# Patient Record
Sex: MALE | Race: WHITE | NOT HISPANIC OR LATINO | Employment: FULL TIME | URBAN - METROPOLITAN AREA
[De-identification: names, ages, dates, MRNs, and addresses within clinical notes are randomized per-mention and may not be internally consistent; named-entity substitution may affect disease eponyms.]

---

## 2017-10-03 ENCOUNTER — OFFICE VISIT (OUTPATIENT)
Dept: FAMILY MEDICINE | Facility: CLINIC | Age: 42
End: 2017-10-03
Payer: COMMERCIAL

## 2017-10-03 VITALS
OXYGEN SATURATION: 94 % | DIASTOLIC BLOOD PRESSURE: 66 MMHG | WEIGHT: 219 LBS | RESPIRATION RATE: 18 BRPM | BODY MASS INDEX: 28.11 KG/M2 | TEMPERATURE: 98 F | HEIGHT: 74 IN | HEART RATE: 91 BPM | SYSTOLIC BLOOD PRESSURE: 122 MMHG

## 2017-10-03 DIAGNOSIS — Z00.00 ANNUAL PHYSICAL EXAM: Primary | ICD-10-CM

## 2017-10-03 DIAGNOSIS — M25.512 CHRONIC LEFT SHOULDER PAIN: ICD-10-CM

## 2017-10-03 DIAGNOSIS — G89.29 CHRONIC LEFT SHOULDER PAIN: ICD-10-CM

## 2017-10-03 PROCEDURE — 99999 PR PBB SHADOW E&M-EST. PATIENT-LVL IV: CPT | Mod: PBBFAC,,, | Performed by: FAMILY MEDICINE

## 2017-10-03 PROCEDURE — 99204 OFFICE O/P NEW MOD 45 MIN: CPT | Mod: S$GLB,,, | Performed by: FAMILY MEDICINE

## 2017-10-03 RX ORDER — IBUPROFEN 400 MG/1
400 TABLET ORAL EVERY 4 HOURS
COMMUNITY
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
## 2017-10-12 ENCOUNTER — LAB VISIT (OUTPATIENT)
Dept: LAB | Facility: HOSPITAL | Age: 42
End: 2017-10-12
Attending: FAMILY MEDICINE
Payer: COMMERCIAL

## 2017-10-12 DIAGNOSIS — Z00.00 ANNUAL PHYSICAL EXAM: ICD-10-CM

## 2017-10-12 LAB
ALBUMIN SERPL BCP-MCNC: 4.4 G/DL
ALP SERPL-CCNC: 67 U/L
ALT SERPL W/O P-5'-P-CCNC: 25 U/L
ANION GAP SERPL CALC-SCNC: 13 MMOL/L
AST SERPL-CCNC: 21 U/L
BASOPHILS # BLD AUTO: 0.02 K/UL
BASOPHILS NFR BLD: 0.4 %
BILIRUB SERPL-MCNC: 0.6 MG/DL
BUN SERPL-MCNC: 15 MG/DL
CALCIUM SERPL-MCNC: 9.6 MG/DL
CHLORIDE SERPL-SCNC: 105 MMOL/L
CHOLEST SERPL-MCNC: 238 MG/DL
CHOLEST/HDLC SERPL: 5.1 {RATIO}
CO2 SERPL-SCNC: 25 MMOL/L
CREAT SERPL-MCNC: 0.97 MG/DL
DIFFERENTIAL METHOD: NORMAL
EOSINOPHIL # BLD AUTO: 0.1 K/UL
EOSINOPHIL NFR BLD: 1.6 %
ERYTHROCYTE [DISTWIDTH] IN BLOOD BY AUTOMATED COUNT: 13.8 %
EST. GFR  (AFRICAN AMERICAN): >60 ML/MIN/1.73 M^2
EST. GFR  (NON AFRICAN AMERICAN): >60 ML/MIN/1.73 M^2
GLUCOSE SERPL-MCNC: 78 MG/DL
HCT VFR BLD AUTO: 42.9 %
HDLC SERPL-MCNC: 47 MG/DL
HDLC SERPL: 19.7 %
HGB BLD-MCNC: 14.1 G/DL
LDLC SERPL CALC-MCNC: 165.6 MG/DL
LYMPHOCYTES # BLD AUTO: 1.7 K/UL
LYMPHOCYTES NFR BLD: 34.5 %
MCH RBC QN AUTO: 28.9 PG
MCHC RBC AUTO-ENTMCNC: 32.9 G/DL
MCV RBC AUTO: 88 FL
MONOCYTES # BLD AUTO: 0.5 K/UL
MONOCYTES NFR BLD: 10.2 %
NEUTROPHILS # BLD AUTO: 2.7 K/UL
NEUTROPHILS NFR BLD: 53.3 %
NONHDLC SERPL-MCNC: 191 MG/DL
PLATELET # BLD AUTO: 207 K/UL
PMV BLD AUTO: 11.3 FL
POTASSIUM SERPL-SCNC: 4.3 MMOL/L
PROT SERPL-MCNC: 7.4 G/DL
RBC # BLD AUTO: 4.88 M/UL
SODIUM SERPL-SCNC: 143 MMOL/L
TRIGL SERPL-MCNC: 127 MG/DL
TSH SERPL DL<=0.005 MIU/L-ACNC: 1.36 UIU/ML
WBC # BLD AUTO: 4.99 K/UL

## 2017-10-12 PROCEDURE — 85025 COMPLETE CBC W/AUTO DIFF WBC: CPT | Mod: PO

## 2017-10-12 PROCEDURE — 36415 COLL VENOUS BLD VENIPUNCTURE: CPT | Mod: PO

## 2017-10-12 PROCEDURE — 80053 COMPREHEN METABOLIC PANEL: CPT | Mod: PO

## 2017-10-12 PROCEDURE — 84443 ASSAY THYROID STIM HORMONE: CPT | Mod: PO

## 2017-10-12 PROCEDURE — 80061 LIPID PANEL: CPT

## 2017-10-17 ENCOUNTER — OFFICE VISIT (OUTPATIENT)
Dept: FAMILY MEDICINE | Facility: CLINIC | Age: 42
End: 2017-10-17
Payer: COMMERCIAL

## 2017-10-17 VITALS
DIASTOLIC BLOOD PRESSURE: 68 MMHG | HEIGHT: 74 IN | WEIGHT: 217.5 LBS | OXYGEN SATURATION: 98 % | BODY MASS INDEX: 27.91 KG/M2 | HEART RATE: 108 BPM | RESPIRATION RATE: 18 BRPM | SYSTOLIC BLOOD PRESSURE: 122 MMHG | TEMPERATURE: 98 F

## 2017-10-17 DIAGNOSIS — E78.5 HYPERLIPIDEMIA, UNSPECIFIED HYPERLIPIDEMIA TYPE: Primary | ICD-10-CM

## 2017-10-17 PROCEDURE — 99999 PR PBB SHADOW E&M-EST. PATIENT-LVL III: CPT | Mod: PBBFAC,,, | Performed by: FAMILY MEDICINE

## 2017-10-17 PROCEDURE — 99214 OFFICE O/P EST MOD 30 MIN: CPT | Mod: S$GLB,,, | Performed by: FAMILY MEDICINE

## 2017-10-17 NOTE — PROGRESS NOTES
"HPI:  Nicolas Marx is a 42 y.o. year old male that  presents for review of lab results.  Patient states that he has stopped smoking and replaced this with the period he now currently takes 20 ounces of coffee with 4 teaspoons sugar daily.  Patient is a 68 in further improving his lifestyle to have a positive effect on his cholesterol and weight..  Chief Complaint   Patient presents with    Follow-up     lab results   .     HPI      History reviewed. No pertinent past medical history.  Social History     Social History    Marital status:      Spouse name: N/A    Number of children: N/A    Years of education: N/A     Occupational History    Not on file.     Social History Main Topics    Smoking status: Former Smoker     Quit date: 4/5/2016    Smokeless tobacco: Never Used    Alcohol use Yes      Comment: occasional    Drug use: No    Sexual activity: Not on file     Other Topics Concern    Not on file     Social History Narrative    No narrative on file     Past Surgical History:   Procedure Laterality Date    HERNIA REPAIR      KNEE SURGERY      TONSILLECTOMY       Family History   Problem Relation Age of Onset    Emphysema Mother     Diabetes Father     No Known Problems Sister     No Known Problems Daughter     No Known Problems Sister            Review of Systems  General ROS: negative for chills, fever or weight loss  ENT ROS: negative for epistaxis, sore throat or rhinorrhea  Respiratory ROS: no cough, shortness of breath, or wheezing  Cardiovascular ROS: no chest pain or dyspnea on exertion  Gastrointestinal ROS: no abdominal pain, change in bowel habits, or black/ bloody stools    Physical Exam:  /68 (BP Location: Left arm, Patient Position: Sitting, BP Method: Medium (Manual))   Pulse 108   Temp 98.3 °F (36.8 °C) (Oral)   Resp 18   Ht 6' 2" (1.88 m)   Wt 98.6 kg (217 lb 7.7 oz)   SpO2 98%   BMI 27.92 kg/m²   General appearance: alert, cooperative, no " distress  Constitutional:Oriented to person, place, and time.appears well-developed and well-nourished.  Lungs: clear to auscultation bilaterally, no dullness to percussion bilaterally  Heart: regular rate and rhythm without rub; no displacement of the PMI , S1&S2 present  Physical Exam    LABS:    Complete Blood Count  Lab Results   Component Value Date    RBC 4.88 10/12/2017    HGB 14.1 10/12/2017    HCT 42.9 10/12/2017    MCV 88 10/12/2017    MCH 28.9 10/12/2017    MCHC 32.9 10/12/2017    RDW 13.8 10/12/2017     10/12/2017    MPV 11.3 10/12/2017    GRAN 2.7 10/12/2017    GRAN 53.3 10/12/2017    LYMPH 1.7 10/12/2017    LYMPH 34.5 10/12/2017    MONO 0.5 10/12/2017    MONO 10.2 10/12/2017    EOS 0.1 10/12/2017    BASO 0.02 10/12/2017    EOSINOPHIL 1.6 10/12/2017    BASOPHIL 0.4 10/12/2017    DIFFMETHOD Automated 10/12/2017       Comprehensive Metabolic Panel  Lab Results   Component Value Date    GLU 78 10/12/2017    BUN 15 10/12/2017    CREATININE 0.97 10/12/2017     10/12/2017    K 4.3 10/12/2017     10/12/2017    PROT 7.4 10/12/2017    ALBUMIN 4.4 10/12/2017    BILITOT 0.6 10/12/2017    AST 21 10/12/2017    ALKPHOS 67 10/12/2017    CO2 25 10/12/2017    ALT 25 10/12/2017    ANIONGAP 13 10/12/2017    EGFRNONAA >60.0 10/12/2017    ESTGFRAFRICA >60.0 10/12/2017       LIPID  Lab Results   Component Value Date    CHOL 238 (H) 10/12/2017    HDL 47 10/12/2017         TSH  Lab Results   Component Value Date    TSH 1.360 10/12/2017       Current Outpatient Prescriptions   Medication Sig Dispense Refill    ibuprofen (ADVIL,MOTRIN) 400 MG tablet Take 400 mg by mouth every 4 (four) hours.       No current facility-administered medications for this visit.        Assessment:    ICD-10-CM ICD-9-CM    1. Hyperlipidemia, unspecified hyperlipidemia type E78.5 272.4          Plan:  Patient does not wish to go on cholesterol medicine at this time.  He was given instructions on stricter modification of his  lifestyle choices when it comes to food choices.  He was given 3 months to make improvements to change cholesterol or he agrees to go on cholesterol lowering medication on next visit.  Nicolas was given information on how to improve their cholesterol by 1) Decreasing their intake of high fat foods (i.e. Fried foods,wahl, potato chips), 2) Increase GOOD fat intake (i.e. Omega Fatty Acids- olive oil , baked and broiled fish, flax seed,coconut oil), 3) Exercise 5 times a week,  30min/day, 4) High fiber intake daily (i.e. Whole grains and vegetable and raw fruits.   Return in 3 months (on 1/17/2018).          Miya Craft MD

## 2017-10-23 NOTE — PATIENT INSTRUCTIONS
"  Lipid Panel  Does this test have other names?  Lipid profile, lipoprotein profile  What is this test?  This group of tests measures the amount of cholesterol and other fats in your blood.  Cholesterol and triglycerides are lipids, or fats. These fats are important for cell health, but they can be harmful when they build up in the blood. Sometimes they can lead to clogged, inflamed arteries, a condition call atherosclerosis. This may keep your heart from working normally.  This panel of tests helps predict your risk for heart disease and stroke.  A lipid panel measures these fats:  · Total cholesterol  · LDL ("bad") cholesterol  · HDL ("good") cholesterol  · Triglycerides, another type of fat that causes hardening of the arteries  Why do I need this test?  You may need this panel of tests if you have a family history of heart disease or stroke.  You may also have this test if your healthcare provider believes you're at risk for heart disease. These are risk factors:  · High blood pressure  · Diabetes or prediabetes  · Overweight or obesity  · Smoking  · Lack of exercise  · Diet of unhealthy foods  · Stress  · High total cholesterol  If you are already being treated for heart disease, you may have this test to see whether treatment is working.  What other tests might I have along with this test?  Your healthcare provider may also order other tests to look at how well your heart is working. These tests may include:  · Electrocardiogram, or ECG, which tests your heart's electrical impulses to see if it is beating normally  · Stress test, in which you may have to exercise while being monitored by ECG  · Echocardiogram, which uses sound waves to make pictures of your heart  · Cardiac catheterization. For this test, a healthcare provider puts a tube into your blood vessels and injects dye. X-rays are then done to look for clogs in the vessels.  Your provider may also order tests for high blood pressure or blood sugar, or " glucose.  What do my test results mean?  Many things may affect your lab test results. These include the method each lab uses to do the test. Even if your test results are different from the normal value, you may not have a problem. To learn what the results mean for you, talk with your healthcare provider.  Results are given in milligrams per deciliter (mg/dL). Here are the ranges for total cholesterol in adults:  · Normal: Less than 200 mg/dL  · Borderline high: 200 to 239 mg/dL  · High: At or above 240 mg/dL  These are the adult ranges for LDL cholesterol:  · Optimal: Less than 100 mg/dL (this is the goal for people with diabetes or heart disease)  · Near optimal: 100 to 129 mg/dL  · Borderline high: 130 to 159 mg/dL  · High: 160 to 189 mg/dL  · Very high: 190 mg/dL and higher  The above numbers are general guidelines, because actual goals depend on the number of risk factors you have for heart disease.  Your HDL cholesterol levels should be above 40 mg/dL. This type of fat is actually good for you because it lowers your risk of heart disease. The higher the number, the lower your risk. Sixty mg/dL or above is considered the level to protect you against heart disease.  · High levels of triglycerides are linked with a higher heart disease risk. Here are the adult ranges:  · Normal: Less than 150 mg/dL  · Borderline high: 150 to 199 mg/dL  · High: 200 to 499 mg/dL  · Very high: Above 500 mg/dL  Depending on your test results, your healthcare provider will decide whether you need lifestyle changes or medicines to lower your cholesterol.  Your results and targets will vary according to your age and health. If you have high blood pressure or diabetes, you're at higher risk of having heart disease. You may have to take medicine to get your cholesterol and triglyceride levels even lower.  How is this test done?  The test requires a blood sample, which is drawn through a needle from a vein in your arm.  Does this test  pose any risks?  Taking a blood sample with a needle carries risks that include bleeding, infection, bruising, or feeling dizzy. When the needle pricks your arm, you may feel a slight stinging sensation or pain. Afterward, the site may be slightly sore.  What might affect my test results?  Being sick or under stress, and taking certain medicines can affect your results.  What you eat, how often you exercise, and whether you smoke can also affect your lipid profile.  How do I prepare for the test?  You may need to not eat or drink anything but water for 12 to 14 hours before this test. In addition, be sure your healthcare provider knows about all medicines, herbs, vitamins, and supplements you are taking. This includes medicines that don't need a prescription and any illicit drugs you may use.      © 5260-1294 [employer]. [address] [place] [address], [place] [address]. All rights reserved. This information is not intended as a substitute for professional medical care. Always follow your healthcare professional's instructions.

## 2018-01-17 ENCOUNTER — OFFICE VISIT (OUTPATIENT)
Dept: FAMILY MEDICINE | Facility: CLINIC | Age: 43
End: 2018-01-17
Payer: COMMERCIAL

## 2018-01-17 VITALS
RESPIRATION RATE: 18 BRPM | BODY MASS INDEX: 28.12 KG/M2 | DIASTOLIC BLOOD PRESSURE: 68 MMHG | HEART RATE: 81 BPM | WEIGHT: 219.13 LBS | HEIGHT: 74 IN | SYSTOLIC BLOOD PRESSURE: 112 MMHG | TEMPERATURE: 98 F | OXYGEN SATURATION: 95 %

## 2018-01-17 DIAGNOSIS — N52.9 ERECTILE DYSFUNCTION, UNSPECIFIED ERECTILE DYSFUNCTION TYPE: ICD-10-CM

## 2018-01-17 DIAGNOSIS — E78.5 HYPERLIPIDEMIA, UNSPECIFIED HYPERLIPIDEMIA TYPE: Primary | ICD-10-CM

## 2018-01-17 PROCEDURE — 99999 PR PBB SHADOW E&M-EST. PATIENT-LVL IV: CPT | Mod: PBBFAC,,, | Performed by: FAMILY MEDICINE

## 2018-01-17 PROCEDURE — 99214 OFFICE O/P EST MOD 30 MIN: CPT | Mod: S$GLB,,, | Performed by: FAMILY MEDICINE

## 2018-01-17 NOTE — PROGRESS NOTES
HPI:  Nicolas Marx is a 42 y.o. year old male that  presents for f/u of hypercholesterol . He has changed what he is eating and cutting out saturated fats. He is more active at work. He feels that he may not be getting enough calories. He is eating more tuna.He is taking a multivitamin.   He would like his testosterone level checked because he has lost his sexual desire and does not get erections.  He is having trouble having difficulty with restful sleep through the night .  Chief Complaint   Patient presents with    Follow-up     3 month f/u on cholesterol--pt states he thinks he has been doing good   .     HPI      History reviewed. No pertinent past medical history.  Social History     Social History    Marital status:      Spouse name: N/A    Number of children: N/A    Years of education: N/A     Occupational History    Not on file.     Social History Main Topics    Smoking status: Former Smoker     Quit date: 4/5/2016    Smokeless tobacco: Never Used    Alcohol use Yes      Comment: occasional    Drug use: No    Sexual activity: Not on file     Other Topics Concern    Not on file     Social History Narrative    No narrative on file     Past Surgical History:   Procedure Laterality Date    HERNIA REPAIR      KNEE SURGERY      TONSILLECTOMY       Family History   Problem Relation Age of Onset    Emphysema Mother     Diabetes Father     No Known Problems Sister     No Known Problems Daughter     No Known Problems Sister            Review of Systems  General ROS: negative for chills, fever or weight loss  ENT ROS: negative for epistaxis, sore throat or rhinorrhea  Respiratory ROS: no cough, shortness of breath, or wheezing  Cardiovascular ROS: no chest pain or dyspnea on exertion  Gastrointestinal ROS: no abdominal pain, change in bowel habits, or black/ bloody stools    Physical Exam:  /68 (BP Location: Right arm, Patient Position: Sitting, BP Method: Medium (Manual))   Pulse  "81   Temp 98.1 °F (36.7 °C) (Oral)   Resp 18   Ht 6' 2" (1.88 m)   Wt 99.4 kg (219 lb 2.2 oz)   SpO2 95%   BMI 28.14 kg/m²   General appearance: alert, cooperative, no distress  Constitutional:Oriented to person, place, and time.appears well-developed and well-nourished.  Lungs: clear to auscultation bilaterally, no dullness to percussion bilaterally  Heart: regular rate and rhythm without rub; no displacement of the PMI , S1&S2 present    Physical Exam    LABS:    Complete Blood Count  Lab Results   Component Value Date    RBC 4.88 10/12/2017    HGB 14.1 10/12/2017    HCT 42.9 10/12/2017    MCV 88 10/12/2017    MCH 28.9 10/12/2017    MCHC 32.9 10/12/2017    RDW 13.8 10/12/2017     10/12/2017    MPV 11.3 10/12/2017    GRAN 2.7 10/12/2017    GRAN 53.3 10/12/2017    LYMPH 1.7 10/12/2017    LYMPH 34.5 10/12/2017    MONO 0.5 10/12/2017    MONO 10.2 10/12/2017    EOS 0.1 10/12/2017    BASO 0.02 10/12/2017    EOSINOPHIL 1.6 10/12/2017    BASOPHIL 0.4 10/12/2017    DIFFMETHOD Automated 10/12/2017       Comprehensive Metabolic Panel  Lab Results   Component Value Date    GLU 78 10/12/2017    BUN 15 10/12/2017    CREATININE 0.97 10/12/2017     10/12/2017    K 4.3 10/12/2017     10/12/2017    PROT 7.4 10/12/2017    ALBUMIN 4.4 10/12/2017    BILITOT 0.6 10/12/2017    AST 21 10/12/2017    ALKPHOS 67 10/12/2017    CO2 25 10/12/2017    ALT 25 10/12/2017    ANIONGAP 13 10/12/2017    EGFRNONAA >60.0 10/12/2017    ESTGFRAFRICA >60.0 10/12/2017       LIPID  Lab Results   Component Value Date    CHOL 238 (H) 10/12/2017    HDL 47 10/12/2017         TSH  Lab Results   Component Value Date    TSH 1.360 10/12/2017       Current Outpatient Prescriptions   Medication Sig Dispense Refill    ibuprofen (ADVIL,MOTRIN) 400 MG tablet Take 400 mg by mouth every 4 (four) hours.       No current facility-administered medications for this visit.        Assessment:    ICD-10-CM ICD-9-CM    1. Hyperlipidemia, unspecified " hyperlipidemia type E78.5 272.4 Lipid panel   2. Erectile dysfunction, unspecified erectile dysfunction type N52.9 607.84 Testosterone         Plan:    Follow-up in 4 weeks (on 2/12/2018).          Miya Craft MD

## 2018-01-23 ENCOUNTER — LAB VISIT (OUTPATIENT)
Dept: LAB | Facility: HOSPITAL | Age: 43
End: 2018-01-23
Attending: FAMILY MEDICINE
Payer: COMMERCIAL

## 2018-01-23 DIAGNOSIS — E78.5 HYPERLIPIDEMIA, UNSPECIFIED HYPERLIPIDEMIA TYPE: ICD-10-CM

## 2018-01-23 DIAGNOSIS — N52.9 ERECTILE DYSFUNCTION, UNSPECIFIED ERECTILE DYSFUNCTION TYPE: ICD-10-CM

## 2018-01-23 LAB
CHOLEST SERPL-MCNC: 237 MG/DL
CHOLEST/HDLC SERPL: 5.9 {RATIO}
HDLC SERPL-MCNC: 40 MG/DL
HDLC SERPL: 16.9 %
LDLC SERPL CALC-MCNC: 174.6 MG/DL
NONHDLC SERPL-MCNC: 197 MG/DL
TESTOST SERPL-MCNC: 413 NG/DL
TRIGL SERPL-MCNC: 112 MG/DL

## 2018-01-23 PROCEDURE — 36415 COLL VENOUS BLD VENIPUNCTURE: CPT | Mod: PO

## 2018-01-23 PROCEDURE — 84403 ASSAY OF TOTAL TESTOSTERONE: CPT | Mod: PO

## 2018-01-23 PROCEDURE — 80061 LIPID PANEL: CPT

## 2019-05-31 DIAGNOSIS — Z13.0 SCREENING FOR DEFICIENCY ANEMIA: Primary | ICD-10-CM

## 2019-05-31 DIAGNOSIS — Z13.1 DIABETES MELLITUS SCREENING: ICD-10-CM

## 2019-05-31 DIAGNOSIS — Z13.29 THYROID DISORDER SCREEN: ICD-10-CM

## 2019-05-31 DIAGNOSIS — E78.5 HYPERLIPIDEMIA, UNSPECIFIED HYPERLIPIDEMIA TYPE: ICD-10-CM

## 2019-06-03 ENCOUNTER — LAB VISIT (OUTPATIENT)
Dept: LAB | Facility: HOSPITAL | Age: 44
End: 2019-06-03
Attending: NURSE PRACTITIONER
Payer: COMMERCIAL

## 2019-06-03 DIAGNOSIS — Z13.29 THYROID DISORDER SCREEN: ICD-10-CM

## 2019-06-03 DIAGNOSIS — E78.5 HYPERLIPIDEMIA, UNSPECIFIED HYPERLIPIDEMIA TYPE: ICD-10-CM

## 2019-06-03 DIAGNOSIS — Z13.0 SCREENING FOR DEFICIENCY ANEMIA: ICD-10-CM

## 2019-06-03 DIAGNOSIS — Z13.1 DIABETES MELLITUS SCREENING: ICD-10-CM

## 2019-06-03 LAB
ALBUMIN SERPL BCP-MCNC: 3.9 G/DL (ref 3.5–5.2)
ALP SERPL-CCNC: 59 U/L (ref 38–126)
ALT SERPL W/O P-5'-P-CCNC: 21 U/L (ref 10–44)
ANION GAP SERPL CALC-SCNC: 6 MMOL/L (ref 8–16)
AST SERPL-CCNC: 20 U/L (ref 15–46)
BASOPHILS # BLD AUTO: 0.01 K/UL (ref 0–0.2)
BASOPHILS NFR BLD: 0.2 % (ref 0–1.9)
BILIRUB SERPL-MCNC: 0.3 MG/DL (ref 0.1–1)
BUN SERPL-MCNC: 18 MG/DL (ref 2–20)
CALCIUM SERPL-MCNC: 9.1 MG/DL (ref 8.7–10.5)
CHLORIDE SERPL-SCNC: 106 MMOL/L (ref 95–110)
CHOLEST SERPL-MCNC: 210 MG/DL (ref 120–199)
CHOLEST/HDLC SERPL: 4.9 {RATIO} (ref 2–5)
CO2 SERPL-SCNC: 28 MMOL/L (ref 23–29)
CREAT SERPL-MCNC: 0.9 MG/DL (ref 0.5–1.4)
DIFFERENTIAL METHOD: NORMAL
EOSINOPHIL # BLD AUTO: 0.1 K/UL (ref 0–0.5)
EOSINOPHIL NFR BLD: 2.7 % (ref 0–8)
ERYTHROCYTE [DISTWIDTH] IN BLOOD BY AUTOMATED COUNT: 13.5 % (ref 11.5–14.5)
EST. GFR  (AFRICAN AMERICAN): >60 ML/MIN/1.73 M^2
EST. GFR  (NON AFRICAN AMERICAN): >60 ML/MIN/1.73 M^2
GLUCOSE SERPL-MCNC: 102 MG/DL (ref 70–110)
HCT VFR BLD AUTO: 44.5 % (ref 40–54)
HDLC SERPL-MCNC: 43 MG/DL (ref 40–75)
HDLC SERPL: 20.5 % (ref 20–50)
HGB BLD-MCNC: 14.4 G/DL (ref 14–18)
LDLC SERPL CALC-MCNC: 142 MG/DL (ref 63–159)
LYMPHOCYTES # BLD AUTO: 1.8 K/UL (ref 1–4.8)
LYMPHOCYTES NFR BLD: 37.7 % (ref 18–48)
MCH RBC QN AUTO: 28.5 PG (ref 27–31)
MCHC RBC AUTO-ENTMCNC: 32.4 G/DL (ref 32–36)
MCV RBC AUTO: 88 FL (ref 82–98)
MONOCYTES # BLD AUTO: 0.5 K/UL (ref 0.3–1)
MONOCYTES NFR BLD: 9.5 % (ref 4–15)
NEUTROPHILS # BLD AUTO: 2.4 K/UL (ref 1.8–7.7)
NEUTROPHILS NFR BLD: 49.7 % (ref 38–73)
NONHDLC SERPL-MCNC: 167 MG/DL
PLATELET # BLD AUTO: 198 K/UL (ref 150–350)
PMV BLD AUTO: 10.9 FL (ref 9.2–12.9)
POTASSIUM SERPL-SCNC: 4.2 MMOL/L (ref 3.5–5.1)
PROT SERPL-MCNC: 6.7 G/DL (ref 6–8.4)
RBC # BLD AUTO: 5.05 M/UL (ref 4.6–6.2)
SODIUM SERPL-SCNC: 140 MMOL/L (ref 136–145)
TRIGL SERPL-MCNC: 125 MG/DL (ref 30–150)
TSH SERPL DL<=0.005 MIU/L-ACNC: 2.3 UIU/ML (ref 0.4–4)
WBC # BLD AUTO: 4.75 K/UL (ref 3.9–12.7)

## 2019-06-03 PROCEDURE — 84443 ASSAY THYROID STIM HORMONE: CPT | Mod: PO

## 2019-06-03 PROCEDURE — 36415 COLL VENOUS BLD VENIPUNCTURE: CPT | Mod: PO

## 2019-06-03 PROCEDURE — 80061 LIPID PANEL: CPT

## 2019-06-03 PROCEDURE — 85025 COMPLETE CBC W/AUTO DIFF WBC: CPT | Mod: PO

## 2019-06-03 PROCEDURE — 80053 COMPREHEN METABOLIC PANEL: CPT | Mod: PO

## 2019-06-14 ENCOUNTER — OFFICE VISIT (OUTPATIENT)
Dept: FAMILY MEDICINE | Facility: CLINIC | Age: 44
End: 2019-06-14
Payer: COMMERCIAL

## 2019-06-14 VITALS
DIASTOLIC BLOOD PRESSURE: 86 MMHG | BODY MASS INDEX: 27.84 KG/M2 | HEIGHT: 74 IN | SYSTOLIC BLOOD PRESSURE: 124 MMHG | RESPIRATION RATE: 18 BRPM | WEIGHT: 216.94 LBS | OXYGEN SATURATION: 96 % | TEMPERATURE: 98 F | HEART RATE: 76 BPM

## 2019-06-14 DIAGNOSIS — Z00.00 ANNUAL PHYSICAL EXAM: Primary | ICD-10-CM

## 2019-06-14 DIAGNOSIS — Z23 NEED FOR TDAP VACCINATION: ICD-10-CM

## 2019-06-14 DIAGNOSIS — N52.9 ERECTILE DYSFUNCTION, UNSPECIFIED ERECTILE DYSFUNCTION TYPE: ICD-10-CM

## 2019-06-14 DIAGNOSIS — Z13.0 SCREENING FOR DEFICIENCY ANEMIA: ICD-10-CM

## 2019-06-14 DIAGNOSIS — Z13.29 THYROID DISORDER SCREEN: ICD-10-CM

## 2019-06-14 DIAGNOSIS — H91.93 HEARING LOSS ASSOCIATED WITH SYNDROME, BILATERAL: ICD-10-CM

## 2019-06-14 DIAGNOSIS — E78.5 HYPERLIPIDEMIA, UNSPECIFIED HYPERLIPIDEMIA TYPE: ICD-10-CM

## 2019-06-14 DIAGNOSIS — Z13.1 DIABETES MELLITUS SCREENING: ICD-10-CM

## 2019-06-14 PROCEDURE — 99999 PR PBB SHADOW E&M-EST. PATIENT-LVL IV: ICD-10-PCS | Mod: PBBFAC,,, | Performed by: NURSE PRACTITIONER

## 2019-06-14 PROCEDURE — 99999 PR PBB SHADOW E&M-EST. PATIENT-LVL IV: CPT | Mod: PBBFAC,,, | Performed by: NURSE PRACTITIONER

## 2019-06-14 PROCEDURE — 99396 PR PREVENTIVE VISIT,EST,40-64: ICD-10-PCS | Mod: 25,S$GLB,, | Performed by: NURSE PRACTITIONER

## 2019-06-14 PROCEDURE — 99396 PREV VISIT EST AGE 40-64: CPT | Mod: 25,S$GLB,, | Performed by: NURSE PRACTITIONER

## 2019-06-14 PROCEDURE — 90715 TDAP VACCINE GREATER THAN OR EQUAL TO 7YO IM: ICD-10-PCS | Mod: S$GLB,,, | Performed by: NURSE PRACTITIONER

## 2019-06-14 PROCEDURE — 90471 TDAP VACCINE GREATER THAN OR EQUAL TO 7YO IM: ICD-10-PCS | Mod: S$GLB,,, | Performed by: NURSE PRACTITIONER

## 2019-06-14 PROCEDURE — 90471 IMMUNIZATION ADMIN: CPT | Mod: S$GLB,,, | Performed by: NURSE PRACTITIONER

## 2019-06-14 PROCEDURE — 90715 TDAP VACCINE 7 YRS/> IM: CPT | Mod: S$GLB,,, | Performed by: NURSE PRACTITIONER

## 2019-06-14 RX ORDER — TADALAFIL 10 MG/1
TABLET ORAL
COMMUNITY
End: 2022-01-19 | Stop reason: SDUPTHER

## 2019-06-14 RX ORDER — MELOXICAM 15 MG/1
TABLET ORAL
COMMUNITY
End: 2019-06-14 | Stop reason: ALTCHOICE

## 2019-06-14 RX ORDER — SULFAMETHOXAZOLE AND TRIMETHOPRIM 800; 160 MG/1; MG/1
TABLET ORAL
COMMUNITY
Start: 2019-05-02 | End: 2019-06-14 | Stop reason: SINTOL

## 2019-06-14 RX ORDER — PREDNISONE 50 MG/1
TABLET ORAL
COMMUNITY
End: 2019-06-14 | Stop reason: ALTCHOICE

## 2019-06-14 NOTE — PROGRESS NOTES
Subjective:       Patient ID: Nicolas Marx is a 44 y.o. male.    Chief Complaint: Annual Exam (Pt here for a wellness visit )      Patient is a 44-year-old white male with hyperlipidemia, bilateral hearing loss, and primary erectile dysfunction treated by urologist that is here today to establish care and get annual physical exam.  Patient was a previous patient of Dr. Miya Craft who is leaving the practice so is here today to establish care with me.    Patient has hyperlipidemia that is on lifestyle modifications only.  Cholesterol levels today are much improved from 2018.  See results below.  His 10 year cardiovascular risk is 2.3%.  Continue to work on lifestyle modifications and recheck in 1 year.    Patient has erectile dysfunction that is treated by Dr. Quintanilla.  Patient reports he had a recent prostatitis infection that was treated with Bactrim and had at allergic reaction/rash to it so stopped medication.  Reports no symptoms now.    Patient reports chronic bilateral hearing loss over 50% to ears that was evaluated by audiologist in the past.    Wellness labs:  -  CBC within normal limits  -  CMP within normal limits  -  total cholesterol is mildly high at 210 with an LDL of 142 but much improved from last year  -  TSH is within normal limits    Health maintenance:  -  due for Tdap vaccine today        Component      Latest Ref Rng & Units 6/3/2019 1/23/2018 10/12/2017   WBC      3.90 - 12.70 K/uL 4.75  4.99   RBC      4.60 - 6.20 M/uL 5.05  4.88   Hemoglobin      14.0 - 18.0 g/dL 14.4  14.1   Hematocrit      40.0 - 54.0 % 44.5  42.9   MCV      82 - 98 fL 88  88   MCH      27.0 - 31.0 pg 28.5  28.9   MCHC      32.0 - 36.0 g/dL 32.4  32.9   RDW      11.5 - 14.5 % 13.5  13.8   Platelets      150 - 350 K/uL 198  207   MPV      9.2 - 12.9 fL 10.9  11.3   Gran # (ANC)      1.8 - 7.7 K/uL 2.4  2.7   Lymph #      1.0 - 4.8 K/uL 1.8  1.7   Mono #      0.3 - 1.0 K/uL 0.5  0.5   Eos #      0.0 - 0.5 K/uL  0.1  0.1   Baso #      0.00 - 0.20 K/uL 0.01  0.02   Gran%      38.0 - 73.0 % 49.7  53.3   Lymph%      18.0 - 48.0 % 37.7  34.5   Mono%      4.0 - 15.0 % 9.5  10.2   Eosinophil%      0.0 - 8.0 % 2.7  1.6   Basophil%      0.0 - 1.9 % 0.2  0.4   Differential Method       Automated  Automated   Sodium      136 - 145 mmol/L 140  143   Potassium      3.5 - 5.1 mmol/L 4.2  4.3   Chloride      95 - 110 mmol/L 106  105   CO2      23 - 29 mmol/L 28  25   Glucose      70 - 110 mg/dL 102  78   BUN, Bld      2 - 20 mg/dL 18  15   Creatinine      0.50 - 1.40 mg/dL 0.90  0.97   Calcium      8.7 - 10.5 mg/dL 9.1  9.6   PROTEIN TOTAL      6.0 - 8.4 g/dL 6.7  7.4   Albumin      3.5 - 5.2 g/dL 3.9  4.4   BILIRUBIN TOTAL      0.1 - 1.0 mg/dL 0.3  0.6   Alkaline Phosphatase      38 - 126 U/L 59  67   AST      15 - 46 U/L 20  21   ALT      10 - 44 U/L 21  25   Anion Gap      8 - 16 mmol/L 6 (L)  13   eGFR if African American      >60 mL/min/1.73 m:2 >60.0  >60.0   eGFR if non African American      >60 mL/min/1.73 m:2 >60.0  >60.0   Cholesterol      120 - 199 mg/dL 210 (H) 237 (H) 238 (H)   Triglycerides      30 - 150 mg/dL 125 112 127   HDL      40 - 75 mg/dL 43 40 47   LDL Cholesterol External      63.0 - 159.0 mg/dL 142.0 174.6 (H) 165.6 (H)   Hdl/Cholesterol Ratio      20.0 - 50.0 % 20.5 16.9 (L) 19.7 (L)   Total Cholesterol/HDL Ratio      2.0 - 5.0 4.9 5.9 (H) 5.1 (H)   Non-HDL Cholesterol      mg/dL 167 197 191   TSH      0.400 - 4.000 uIU/mL 2.300  1.360     Current Outpatient Medications   Medication Sig Dispense Refill    ibuprofen (ADVIL,MOTRIN) 400 MG tablet Take 400 mg by mouth every 4 (four) hours.      tadalafil (CIALIS) 10 MG tablet Cialis 10 mg tablet   Take 1 tablet every day by oral route as needed for 10 days.       No current facility-administered medications for this visit.        Past Medical History:   Diagnosis Date    ED (erectile dysfunction)     Hearing loss associated with syndrome, bilateral     bilateral  hearing loss with chronic tinnitus -tested by audiologist    Hyperlipidemia 2017    on lifestyle modifications only       Past Surgical History:   Procedure Laterality Date    HERNIA REPAIR      hernia repair as a baby - unsure of location of hernia    KNEE SURGERY Left     infected blood vessel while in the services    SURGICAL REMOVAL OF LYMPH NODE Left     left groin - removed due to infection    TONSILLECTOMY      TYMPANOSTOMY TUBE PLACEMENT      chronic ear infections as a child       Family History   Problem Relation Age of Onset    Emphysema Mother     Hypertension Mother     Hyperlipidemia Mother     Diabetes Father     Hypertension Father     Hyperlipidemia Father     No Known Problems Sister     No Known Problems Daughter     No Known Problems Sister        Social History     Socioeconomic History    Marital status:      Spouse name: Not on file    Number of children: Not on file    Years of education: Not on file    Highest education level: Not on file   Occupational History    Occupation: IT/computer work   Social Needs    Financial resource strain: Not on file    Food insecurity:     Worry: Not on file     Inability: Not on file    Transportation needs:     Medical: Not on file     Non-medical: Not on file   Tobacco Use    Smoking status: Former Smoker     Packs/day: 1.00     Years: 30.00     Pack years: 30.00     Types: Cigarettes     Last attempt to quit: 4/5/2016     Years since quitting: 3.1    Smokeless tobacco: Never Used   Substance and Sexual Activity    Alcohol use: Yes     Comment: once every 3 months - 2 to 3 drinks per occasion    Drug use: No    Sexual activity: Not on file   Lifestyle    Physical activity:     Days per week: Not on file     Minutes per session: Not on file    Stress: Not on file   Relationships    Social connections:     Talks on phone: Not on file     Gets together: Not on file     Attends Mandaeism service: Not on file     Active  "member of club or organization: Not on file     Attends meetings of clubs or organizations: Not on file     Relationship status: Not on file   Other Topics Concern    Not on file   Social History Narrative    Not on file       Review of Systems   Constitutional: Negative for activity change, appetite change, fatigue, fever and unexpected weight change.   HENT: Negative for congestion, ear pain, mouth sores, nosebleeds, postnasal drip, rhinorrhea, sinus pressure, sneezing, sore throat, trouble swallowing and voice change.    Eyes: Negative.    Respiratory: Negative for cough, chest tightness and shortness of breath.    Cardiovascular: Negative for chest pain, palpitations and leg swelling.   Gastrointestinal: Negative.  Negative for abdominal pain, blood in stool, constipation, diarrhea, nausea and vomiting.   Endocrine: Negative.    Genitourinary: Negative for difficulty urinating, dysuria, flank pain, hematuria and urgency.   Musculoskeletal: Negative for arthralgias, back pain, gait problem, joint swelling, myalgias and neck pain.   Skin: Negative for color change, rash and wound.   Allergic/Immunologic: Negative for immunocompromised state.   Neurological: Negative for dizziness, tremors, seizures, syncope, speech difficulty and headaches.   Hematological: Negative for adenopathy. Does not bruise/bleed easily.   Psychiatric/Behavioral: Negative for behavioral problems, dysphoric mood, sleep disturbance and suicidal ideas. The patient is not nervous/anxious.          Objective:     Vitals:    06/14/19 1442   BP: 124/86   BP Location: Left arm   Patient Position: Sitting   BP Method: Medium (Manual)   Pulse: 76   Resp: 18   Temp: 98.1 °F (36.7 °C)   TempSrc: Oral   SpO2: 96%   Weight: 98.4 kg (216 lb 14.9 oz)   Height: 6' 2" (1.88 m)          Physical Exam   Constitutional: He is oriented to person, place, and time. He appears well-developed and well-nourished.   Body mass index is 27.85 kg/m².     HENT:   Head: " Normocephalic.   Right Ear: External ear normal. Tympanic membrane is scarred.   Left Ear: External ear normal.   Nose: Nose normal.   Mouth/Throat: Oropharynx is clear and moist. No oropharyngeal exudate.   Eyes: Pupils are equal, round, and reactive to light. EOM are normal. Right eye exhibits no discharge. Left eye exhibits no discharge. No scleral icterus.   Neck: Normal range of motion. Neck supple. No tracheal deviation present. No thyromegaly present.   Cardiovascular: Normal rate, regular rhythm and normal heart sounds.   No murmur heard.  Pulmonary/Chest: Effort normal and breath sounds normal. No respiratory distress.   Abdominal: Soft. He exhibits no distension.   Musculoskeletal: Normal range of motion. He exhibits no edema.   Lymphadenopathy:     He has no cervical adenopathy.   Neurological: He is alert and oriented to person, place, and time. Coordination normal.   Skin: Skin is warm and dry. No rash noted.   Psychiatric: He has a normal mood and affect. His behavior is normal.         Assessment:         ICD-10-CM ICD-9-CM   1. Annual physical exam Z00.00 V70.0   2. Erectile dysfunction, unspecified erectile dysfunction type N52.9 607.84   3. Hearing loss associated with syndrome, bilateral H91.93 389.8   4. Hyperlipidemia, unspecified hyperlipidemia type E78.5 272.4   5. Thyroid disorder screen Z13.29 V77.0   6. Screening for deficiency anemia Z13.0 V78.1   7. Diabetes mellitus screening Z13.1 V77.1   8. Need for Tdap vaccination Z23 V06.1       Plan:       Annual physical exam  Health Maintenance Summary     Influenza Vaccine Next Due 8/1/2019    Lipid Panel Next Due 6/3/2024     Done 6/3/2019 LIPID PANEL     Done 1/23/2018 LIPID PANEL     Done 10/12/2017 LIPID PANEL    TETANUS VACCINE Next Due 6/14/2029     Done 6/14/2019 Imm Admin: Tdap       Erectile dysfunction, unspecified erectile dysfunction type  -  treated by Urology    Hearing loss associated with syndrome, bilateral  -  evaluated by  audiologist and ENT MDs in the past.  Has chronic tinnitus.    Hyperlipidemia, unspecified hyperlipidemia type  -  levels having improved in the past 2 years with lifestyle modifications.  Ten year risk is at 2.3%.  Recheck in 1 year.  -     Lipid panel; Future; Expected date: 06/08/2020    Thyroid disorder screen  -     TSH; Future; Expected date: 06/08/2020    Screening for deficiency anemia  -     CBC auto differential; Future; Expected date: 06/08/2020    Diabetes mellitus screening  -     Comprehensive metabolic panel; Future; Expected date: 06/08/2020    Need for Tdap vaccination  -     Tdap Vaccine      Follow up in about 1 year (around 6/14/2020) for fasting labs and WELLNESS EXAM.     Patient's Medications   New Prescriptions    No medications on file   Previous Medications    IBUPROFEN (ADVIL,MOTRIN) 400 MG TABLET    Take 400 mg by mouth every 4 (four) hours.    TADALAFIL (CIALIS) 10 MG TABLET    Cialis 10 mg tablet   Take 1 tablet every day by oral route as needed for 10 days.   Modified Medications    No medications on file   Discontinued Medications    MELOXICAM (MOBIC) 15 MG TABLET    meloxicam 15 mg tablet   Take 1 tablet every day by oral route for 30 days.    PREDNISONE (DELTASONE) 50 MG TAB    prednisone 50 mg tablet   Take 1 tablet every day by oral route stop meloxicam while using this medication for 7 days.    SULFAMETHOXAZOLE-TRIMETHOPRIM 800-160MG (BACTRIM DS) 800-160 MG TAB

## 2021-01-04 ENCOUNTER — PATIENT MESSAGE (OUTPATIENT)
Dept: ADMINISTRATIVE | Facility: HOSPITAL | Age: 46
End: 2021-01-04

## 2021-04-05 ENCOUNTER — PATIENT MESSAGE (OUTPATIENT)
Dept: ADMINISTRATIVE | Facility: HOSPITAL | Age: 46
End: 2021-04-05

## 2021-07-07 ENCOUNTER — PATIENT MESSAGE (OUTPATIENT)
Dept: ADMINISTRATIVE | Facility: HOSPITAL | Age: 46
End: 2021-07-07

## 2021-10-05 ENCOUNTER — PATIENT MESSAGE (OUTPATIENT)
Dept: ADMINISTRATIVE | Facility: HOSPITAL | Age: 46
End: 2021-10-05

## 2021-11-15 ENCOUNTER — TELEPHONE (OUTPATIENT)
Dept: FAMILY MEDICINE | Facility: CLINIC | Age: 46
End: 2021-11-15
Payer: COMMERCIAL

## 2021-11-15 DIAGNOSIS — Z11.4 SCREENING FOR HIV (HUMAN IMMUNODEFICIENCY VIRUS): ICD-10-CM

## 2021-11-15 DIAGNOSIS — Z00.00 ANNUAL PHYSICAL EXAM: Primary | ICD-10-CM

## 2021-11-15 DIAGNOSIS — Z11.59 SCREENING FOR VIRAL DISEASE: ICD-10-CM

## 2021-11-22 ENCOUNTER — HOSPITAL ENCOUNTER (EMERGENCY)
Facility: HOSPITAL | Age: 46
Discharge: HOME | End: 2021-11-22
Attending: FAMILY MEDICINE
Payer: COMMERCIAL

## 2021-11-22 VITALS
TEMPERATURE: 98 F | SYSTOLIC BLOOD PRESSURE: 124 MMHG | DIASTOLIC BLOOD PRESSURE: 87 MMHG | BODY MASS INDEX: 30.46 KG/M2 | HEART RATE: 97 BPM | RESPIRATION RATE: 24 BRPM | HEIGHT: 75 IN | OXYGEN SATURATION: 93 % | WEIGHT: 245 LBS

## 2021-11-22 DIAGNOSIS — J20.9 ACUTE BRONCHITIS, UNSPECIFIED ORGANISM: Primary | ICD-10-CM

## 2021-11-22 DIAGNOSIS — R06.02 SOB (SHORTNESS OF BREATH): ICD-10-CM

## 2021-11-22 LAB
ALBUMIN SERPL BCP-MCNC: 4.3 G/DL (ref 3.5–5.2)
ALP SERPL-CCNC: 74 U/L (ref 38–126)
ALT SERPL W/O P-5'-P-CCNC: 18 U/L (ref 10–44)
ANION GAP SERPL CALC-SCNC: 9 MMOL/L (ref 8–16)
AST SERPL-CCNC: 17 U/L (ref 15–46)
BASOPHILS # BLD AUTO: 0.03 K/UL (ref 0–0.2)
BASOPHILS NFR BLD: 0.4 % (ref 0–1.9)
BILIRUB SERPL-MCNC: 0.7 MG/DL (ref 0.1–1)
CALCIUM SERPL-MCNC: 9.1 MG/DL (ref 8.7–10.5)
CHLORIDE SERPL-SCNC: 105 MMOL/L (ref 95–110)
CO2 SERPL-SCNC: 28 MMOL/L (ref 23–29)
CREAT SERPL-MCNC: 1.07 MG/DL (ref 0.5–1.4)
D DIMER PPP IA.FEU-MCNC: <0.19 MG/L FEU
DIFFERENTIAL METHOD: NORMAL
EOSINOPHIL # BLD AUTO: 0.2 K/UL (ref 0–0.5)
EOSINOPHIL NFR BLD: 2.1 % (ref 0–8)
ERYTHROCYTE [DISTWIDTH] IN BLOOD BY AUTOMATED COUNT: 12.7 % (ref 11.5–14.5)
EST. GFR  (AFRICAN AMERICAN): >60 ML/MIN/1.73 M^2
EST. GFR  (NON AFRICAN AMERICAN): >60 ML/MIN/1.73 M^2
GLUCOSE SERPL-MCNC: 126 MG/DL (ref 70–110)
GROUP A STREP, MOLECULAR: NEGATIVE
HCT VFR BLD AUTO: 45.6 % (ref 40–54)
HGB BLD-MCNC: 15 G/DL (ref 14–18)
IMM GRANULOCYTES # BLD AUTO: 0.02 K/UL (ref 0–0.04)
IMM GRANULOCYTES NFR BLD AUTO: 0.3 % (ref 0–0.5)
INFLUENZA A, MOLECULAR: NEGATIVE
INFLUENZA B, MOLECULAR: NEGATIVE
LYMPHOCYTES # BLD AUTO: 1.8 K/UL (ref 1–4.8)
LYMPHOCYTES NFR BLD: 24.4 % (ref 18–48)
MCH RBC QN AUTO: 29.3 PG (ref 27–31)
MCHC RBC AUTO-ENTMCNC: 32.9 G/DL (ref 32–36)
MCV RBC AUTO: 89 FL (ref 82–98)
MONOCYTES # BLD AUTO: 0.6 K/UL (ref 0.3–1)
MONOCYTES NFR BLD: 8.3 % (ref 4–15)
NEUTROPHILS # BLD AUTO: 4.7 K/UL (ref 1.8–7.7)
NEUTROPHILS NFR BLD: 64.5 % (ref 38–73)
NRBC BLD-RTO: 0 /100 WBC
NT-PROBNP SERPL-MCNC: 73 PG/ML (ref 5–450)
PLATELET # BLD AUTO: 199 K/UL (ref 150–450)
PMV BLD AUTO: 10.6 FL (ref 9.2–12.9)
POTASSIUM SERPL-SCNC: 4.2 MMOL/L (ref 3.5–5.1)
PROT SERPL-MCNC: 7 G/DL (ref 6–8.4)
RBC # BLD AUTO: 5.12 M/UL (ref 4.6–6.2)
SARS-COV-2 RDRP RESP QL NAA+PROBE: NEGATIVE
SODIUM SERPL-SCNC: 142 MMOL/L (ref 136–145)
SPECIMEN SOURCE: NORMAL
TROPONIN I SERPL-MCNC: <0.012 NG/ML (ref 0.01–0.03)
UUN UR-MCNC: 17 MG/DL (ref 2–20)
WBC # BLD AUTO: 7.24 K/UL (ref 3.9–12.7)

## 2021-11-22 PROCEDURE — 93010 ELECTROCARDIOGRAM REPORT: CPT | Mod: ,,, | Performed by: INTERNAL MEDICINE

## 2021-11-22 PROCEDURE — 99285 EMERGENCY DEPT VISIT HI MDM: CPT | Mod: 25,ER

## 2021-11-22 PROCEDURE — 80053 COMPREHEN METABOLIC PANEL: CPT | Mod: ER | Performed by: FAMILY MEDICINE

## 2021-11-22 PROCEDURE — U0002 COVID-19 LAB TEST NON-CDC: HCPCS | Mod: ER | Performed by: FAMILY MEDICINE

## 2021-11-22 PROCEDURE — 25000003 PHARM REV CODE 250: Mod: ER | Performed by: FAMILY MEDICINE

## 2021-11-22 PROCEDURE — 83880 ASSAY OF NATRIURETIC PEPTIDE: CPT | Mod: ER | Performed by: FAMILY MEDICINE

## 2021-11-22 PROCEDURE — 94640 AIRWAY INHALATION TREATMENT: CPT | Mod: ER

## 2021-11-22 PROCEDURE — 93005 ELECTROCARDIOGRAM TRACING: CPT | Mod: ER

## 2021-11-22 PROCEDURE — 25500020 PHARM REV CODE 255: Mod: ER | Performed by: FAMILY MEDICINE

## 2021-11-22 PROCEDURE — 84484 ASSAY OF TROPONIN QUANT: CPT | Mod: ER | Performed by: FAMILY MEDICINE

## 2021-11-22 PROCEDURE — 85025 COMPLETE CBC W/AUTO DIFF WBC: CPT | Mod: ER | Performed by: FAMILY MEDICINE

## 2021-11-22 PROCEDURE — 85379 FIBRIN DEGRADATION QUANT: CPT | Mod: ER | Performed by: FAMILY MEDICINE

## 2021-11-22 PROCEDURE — 93010 EKG 12-LEAD: ICD-10-PCS | Mod: ,,, | Performed by: INTERNAL MEDICINE

## 2021-11-22 PROCEDURE — 63600175 PHARM REV CODE 636 W HCPCS: Mod: ER | Performed by: FAMILY MEDICINE

## 2021-11-22 PROCEDURE — 96374 THER/PROPH/DIAG INJ IV PUSH: CPT | Mod: ER

## 2021-11-22 PROCEDURE — 25000242 PHARM REV CODE 250 ALT 637 W/ HCPCS: Mod: ER | Performed by: FAMILY MEDICINE

## 2021-11-22 PROCEDURE — 87651 STREP A DNA AMP PROBE: CPT | Mod: ER | Performed by: FAMILY MEDICINE

## 2021-11-22 PROCEDURE — 87502 INFLUENZA DNA AMP PROBE: CPT | Mod: ER | Performed by: FAMILY MEDICINE

## 2021-11-22 RX ORDER — PREDNISONE 20 MG/1
60 TABLET ORAL
Status: COMPLETED | OUTPATIENT
Start: 2021-11-22 | End: 2021-11-22

## 2021-11-22 RX ORDER — ALBUTEROL SULFATE 2.5 MG/.5ML
2.5 SOLUTION RESPIRATORY (INHALATION)
Status: COMPLETED | OUTPATIENT
Start: 2021-11-22 | End: 2021-11-22

## 2021-11-22 RX ORDER — METHYLPREDNISOLONE SOD SUCC 125 MG
125 VIAL (EA) INJECTION
Status: COMPLETED | OUTPATIENT
Start: 2021-11-22 | End: 2021-11-22

## 2021-11-22 RX ORDER — BENZONATATE 100 MG/1
200 CAPSULE ORAL
Status: COMPLETED | OUTPATIENT
Start: 2021-11-22 | End: 2021-11-22

## 2021-11-22 RX ORDER — ALBUTEROL SULFATE 90 UG/1
2 AEROSOL, METERED RESPIRATORY (INHALATION) EVERY 6 HOURS PRN
Qty: 6.7 G | Refills: 0 | Status: SHIPPED | OUTPATIENT
Start: 2021-11-22 | End: 2022-01-19

## 2021-11-22 RX ORDER — BENZONATATE 200 MG/1
200 CAPSULE ORAL 3 TIMES DAILY PRN
Qty: 30 CAPSULE | Refills: 0 | Status: SHIPPED | OUTPATIENT
Start: 2021-11-22 | End: 2021-12-02

## 2021-11-22 RX ORDER — IPRATROPIUM BROMIDE AND ALBUTEROL SULFATE 2.5; .5 MG/3ML; MG/3ML
3 SOLUTION RESPIRATORY (INHALATION)
Status: DISCONTINUED | OUTPATIENT
Start: 2021-11-22 | End: 2021-11-22

## 2021-11-22 RX ORDER — PREDNISONE 20 MG/1
40 TABLET ORAL DAILY
Qty: 10 TABLET | Refills: 0 | Status: SHIPPED | OUTPATIENT
Start: 2021-11-22 | End: 2021-11-27

## 2021-11-22 RX ADMIN — PREDNISONE 60 MG: 20 TABLET ORAL at 10:11

## 2021-11-22 RX ADMIN — ALBUTEROL SULFATE 2.5 MG: 2.5 SOLUTION RESPIRATORY (INHALATION) at 10:11

## 2021-11-22 RX ADMIN — IOHEXOL 100 ML: 350 INJECTION, SOLUTION INTRAVENOUS at 12:11

## 2021-11-22 RX ADMIN — ALBUTEROL SULFATE 2.5 MG: 2.5 SOLUTION RESPIRATORY (INHALATION) at 11:11

## 2021-11-22 RX ADMIN — BENZONATATE 200 MG: 100 CAPSULE ORAL at 10:11

## 2021-11-22 RX ADMIN — METHYLPREDNISOLONE SODIUM SUCCINATE 125 MG: 125 INJECTION, POWDER, FOR SOLUTION INTRAMUSCULAR; INTRAVENOUS at 01:11

## 2022-01-06 ENCOUNTER — LAB VISIT (OUTPATIENT)
Dept: LAB | Facility: HOSPITAL | Age: 47
End: 2022-01-06
Attending: INTERNAL MEDICINE
Payer: COMMERCIAL

## 2022-01-06 DIAGNOSIS — Z11.4 SCREENING FOR HIV (HUMAN IMMUNODEFICIENCY VIRUS): ICD-10-CM

## 2022-01-06 DIAGNOSIS — Z11.59 SCREENING FOR VIRAL DISEASE: ICD-10-CM

## 2022-01-06 DIAGNOSIS — Z00.00 ANNUAL PHYSICAL EXAM: ICD-10-CM

## 2022-01-06 LAB
ALBUMIN SERPL BCP-MCNC: 3.9 G/DL (ref 3.5–5.2)
ALP SERPL-CCNC: 68 U/L (ref 38–126)
ALT SERPL W/O P-5'-P-CCNC: 24 U/L (ref 10–44)
ANION GAP SERPL CALC-SCNC: 9 MMOL/L (ref 8–16)
AST SERPL-CCNC: 20 U/L (ref 15–46)
BILIRUB SERPL-MCNC: 0.5 MG/DL (ref 0.1–1)
CALCIUM SERPL-MCNC: 8.5 MG/DL (ref 8.7–10.5)
CHLORIDE SERPL-SCNC: 109 MMOL/L (ref 95–110)
CHOLEST SERPL-MCNC: 207 MG/DL (ref 120–199)
CHOLEST/HDLC SERPL: 6.3 {RATIO} (ref 2–5)
CO2 SERPL-SCNC: 26 MMOL/L (ref 23–29)
CREAT SERPL-MCNC: 1 MG/DL (ref 0.5–1.4)
EST. GFR  (AFRICAN AMERICAN): >60 ML/MIN/1.73 M^2
EST. GFR  (NON AFRICAN AMERICAN): >60 ML/MIN/1.73 M^2
GLUCOSE SERPL-MCNC: 113 MG/DL (ref 70–110)
HCV AB SERPL QL IA: NEGATIVE
HDLC SERPL-MCNC: 33 MG/DL (ref 40–75)
HDLC SERPL: 15.9 % (ref 20–50)
HIV 1+2 AB+HIV1 P24 AG SERPL QL IA: NEGATIVE
LDLC SERPL CALC-MCNC: 146.2 MG/DL (ref 63–159)
NONHDLC SERPL-MCNC: 174 MG/DL
POTASSIUM SERPL-SCNC: 4.1 MMOL/L (ref 3.5–5.1)
PROT SERPL-MCNC: 6.6 G/DL (ref 6–8.4)
SODIUM SERPL-SCNC: 144 MMOL/L (ref 136–145)
TRIGL SERPL-MCNC: 139 MG/DL (ref 30–150)
UUN UR-MCNC: 17 MG/DL (ref 2–20)

## 2022-01-06 PROCEDURE — 80053 COMPREHEN METABOLIC PANEL: CPT | Mod: PO | Performed by: INTERNAL MEDICINE

## 2022-01-06 PROCEDURE — 86803 HEPATITIS C AB TEST: CPT | Mod: PO | Performed by: INTERNAL MEDICINE

## 2022-01-06 PROCEDURE — 80061 LIPID PANEL: CPT | Performed by: INTERNAL MEDICINE

## 2022-01-06 PROCEDURE — 87389 HIV-1 AG W/HIV-1&-2 AB AG IA: CPT | Mod: PO | Performed by: INTERNAL MEDICINE

## 2022-01-06 PROCEDURE — 36415 COLL VENOUS BLD VENIPUNCTURE: CPT | Mod: PO | Performed by: INTERNAL MEDICINE

## 2022-01-19 ENCOUNTER — OFFICE VISIT (OUTPATIENT)
Dept: FAMILY MEDICINE | Facility: CLINIC | Age: 47
End: 2022-01-19
Payer: COMMERCIAL

## 2022-01-19 VITALS
HEART RATE: 85 BPM | BODY MASS INDEX: 31.14 KG/M2 | DIASTOLIC BLOOD PRESSURE: 86 MMHG | HEIGHT: 72 IN | RESPIRATION RATE: 18 BRPM | SYSTOLIC BLOOD PRESSURE: 128 MMHG | TEMPERATURE: 98 F | OXYGEN SATURATION: 96 % | WEIGHT: 229.94 LBS

## 2022-01-19 DIAGNOSIS — N52.9 ERECTILE DYSFUNCTION, UNSPECIFIED ERECTILE DYSFUNCTION TYPE: ICD-10-CM

## 2022-01-19 DIAGNOSIS — Z12.11 COLON CANCER SCREENING: ICD-10-CM

## 2022-01-19 DIAGNOSIS — E78.5 HYPERLIPIDEMIA, UNSPECIFIED HYPERLIPIDEMIA TYPE: ICD-10-CM

## 2022-01-19 DIAGNOSIS — Z13.1 DIABETES MELLITUS SCREENING: ICD-10-CM

## 2022-01-19 DIAGNOSIS — Z00.00 ANNUAL PHYSICAL EXAM: Primary | ICD-10-CM

## 2022-01-19 DIAGNOSIS — Z13.29 THYROID DISORDER SCREEN: ICD-10-CM

## 2022-01-19 DIAGNOSIS — Z13.0 SCREENING FOR DEFICIENCY ANEMIA: ICD-10-CM

## 2022-01-19 PROCEDURE — 99999 PR PBB SHADOW E&M-EST. PATIENT-LVL III: ICD-10-PCS | Mod: PBBFAC,,, | Performed by: NURSE PRACTITIONER

## 2022-01-19 PROCEDURE — 99396 PR PREVENTIVE VISIT,EST,40-64: ICD-10-PCS | Mod: S$GLB,,, | Performed by: NURSE PRACTITIONER

## 2022-01-19 PROCEDURE — 99396 PREV VISIT EST AGE 40-64: CPT | Mod: S$GLB,,, | Performed by: NURSE PRACTITIONER

## 2022-01-19 PROCEDURE — 3079F DIAST BP 80-89 MM HG: CPT | Mod: CPTII,S$GLB,, | Performed by: NURSE PRACTITIONER

## 2022-01-19 PROCEDURE — 3079F PR MOST RECENT DIASTOLIC BLOOD PRESSURE 80-89 MM HG: ICD-10-PCS | Mod: CPTII,S$GLB,, | Performed by: NURSE PRACTITIONER

## 2022-01-19 PROCEDURE — 3008F BODY MASS INDEX DOCD: CPT | Mod: CPTII,S$GLB,, | Performed by: NURSE PRACTITIONER

## 2022-01-19 PROCEDURE — 1160F PR REVIEW ALL MEDS BY PRESCRIBER/CLIN PHARMACIST DOCUMENTED: ICD-10-PCS | Mod: CPTII,S$GLB,, | Performed by: NURSE PRACTITIONER

## 2022-01-19 PROCEDURE — 1159F MED LIST DOCD IN RCRD: CPT | Mod: CPTII,S$GLB,, | Performed by: NURSE PRACTITIONER

## 2022-01-19 PROCEDURE — 99999 PR PBB SHADOW E&M-EST. PATIENT-LVL III: CPT | Mod: PBBFAC,,, | Performed by: NURSE PRACTITIONER

## 2022-01-19 PROCEDURE — 3008F PR BODY MASS INDEX (BMI) DOCUMENTED: ICD-10-PCS | Mod: CPTII,S$GLB,, | Performed by: NURSE PRACTITIONER

## 2022-01-19 PROCEDURE — 3074F SYST BP LT 130 MM HG: CPT | Mod: CPTII,S$GLB,, | Performed by: NURSE PRACTITIONER

## 2022-01-19 PROCEDURE — 3074F PR MOST RECENT SYSTOLIC BLOOD PRESSURE < 130 MM HG: ICD-10-PCS | Mod: CPTII,S$GLB,, | Performed by: NURSE PRACTITIONER

## 2022-01-19 PROCEDURE — 1160F RVW MEDS BY RX/DR IN RCRD: CPT | Mod: CPTII,S$GLB,, | Performed by: NURSE PRACTITIONER

## 2022-01-19 PROCEDURE — 1159F PR MEDICATION LIST DOCUMENTED IN MEDICAL RECORD: ICD-10-PCS | Mod: CPTII,S$GLB,, | Performed by: NURSE PRACTITIONER

## 2022-01-19 RX ORDER — MELOXICAM 15 MG/1
TABLET ORAL
COMMUNITY
End: 2022-01-19

## 2022-01-19 RX ORDER — TADALAFIL 10 MG/1
10 TABLET ORAL DAILY PRN
Qty: 30 TABLET | Refills: 5 | Status: SHIPPED | OUTPATIENT
Start: 2022-01-19 | End: 2023-01-17 | Stop reason: SDUPTHER

## 2022-01-19 RX ORDER — TADALAFIL 10 MG/1
TABLET ORAL
Status: CANCELLED | OUTPATIENT
Start: 2022-01-19

## 2022-01-19 NOTE — PROGRESS NOTES
Subjective:       Patient ID: Nicolas Marx is a 46 y.o. male.    Chief Complaint: Annual Exam and Medication Refill    HPI    Patient is a 46-year-old white male with hyperlipidemia, bilateral hearing loss, and primary erectile dysfunction treated by urologist in the pastthat is here today for annual physical exam with fasting lab results and medication refill on Cialis.     Patient has hyperlipidemia that is on lifestyle modifications only.  Cholesterol levels today are much improved from 2018.  Total cholesterol now 207 with .2.  Continue to work on lifestyle modifications and recheck in 1 year.     Patient has erectile dysfunction that was treated by Dr. Quintanilla in past. Takes Cialis 10 mg prn.  Will refill prescription.     Patient reports chronic bilateral hearing loss over 50% to ears that was evaluated by audiologist in the past.     Wellness labs:  -  CMP with , normal kidney and liver function  - cholesterol improved from 2017 and 2018 labs - continue lifestyle modifications  -  HIV and Hepatitis C screenings are negative.     Health maintenance:  -  declined covid and flu vaccines  -  Due for colonoscopy - agreed to colonoscopy with Dr. Arndt - order sent today.    Component      Latest Ref Rng & Units 1/6/2022 6/3/2019 1/23/2018 10/12/2017   Sodium      136 - 145 mmol/L 144 140  143   Potassium      3.5 - 5.1 mmol/L 4.1 4.2  4.3   Chloride      95 - 110 mmol/L 109 106  105   CO2      23 - 29 mmol/L 26 28  25   Glucose      70 - 110 mg/dL 113 (H) 102  78   BUN      2 - 20 mg/dL 17 18  15   Creatinine      0.50 - 1.40 mg/dL 1.00 0.90  0.97   Calcium      8.7 - 10.5 mg/dL 8.5 (L) 9.1  9.6   PROTEIN TOTAL      6.0 - 8.4 g/dL 6.6 6.7  7.4   Albumin      3.5 - 5.2 g/dL 3.9 3.9  4.4   BILIRUBIN TOTAL      0.1 - 1.0 mg/dL 0.5 0.3  0.6   Alkaline Phosphatase      38 - 126 U/L 68 59  67   AST      15 - 46 U/L 20 20  21   ALT      10 - 44 U/L 24 21  25   Anion Gap      8 - 16 mmol/L 9 6 (L)   13   eGFR if African American      >60 mL/min/1.73 m:2 >60.0 >60.0  >60.0   eGFR if non African American      >60 mL/min/1.73 m:2 >60.0 >60.0  >60.0   Cholesterol      120 - 199 mg/dL 207 (H) 210 (H) 237 (H) 238 (H)   Triglycerides      30 - 150 mg/dL 139 125 112 127   HDL      40 - 75 mg/dL 33 (L) 43 40 47   LDL Cholesterol External      63.0 - 159.0 mg/dL 146.2 142.0 174.6 (H) 165.6 (H)   HDL/Cholesterol Ratio      20.0 - 50.0 % 15.9 (L) 20.5 16.9 (L) 19.7 (L)   Total Cholesterol/HDL Ratio      2.0 - 5.0 6.3 (H) 4.9 5.9 (H) 5.1 (H)   Non-HDL Cholesterol      mg/dL 174 167 197 191   Hepatitis C Ab      Negative Negative      HIV 1/2 Ag/Ab      Negative Negative           Current Outpatient Medications   Medication Sig Dispense Refill    ibuprofen (ADVIL,MOTRIN) 400 MG tablet Take 400 mg by mouth every 4 (four) hours.      tadalafiL (CIALIS) 10 MG tablet Cialis 10 mg tablet   Take 1 tablet every day by oral route as needed for 10 days.      albuterol (PROVENTIL/VENTOLIN HFA) 90 mcg/actuation inhaler Inhale 2 puffs into the lungs every 6 (six) hours as needed for Wheezing. Rescue 6.7 g 0    meloxicam (MOBIC) 15 MG tablet meloxicam 15 mg tablet   Take 1 tablet every day by oral route for 30 days.       No current facility-administered medications for this visit.       Past Medical History:   Diagnosis Date    ED (erectile dysfunction)     treated by Dr. Quintanilla    Hearing loss associated with syndrome, bilateral     bilateral hearing loss with chronic tinnitus -tested by audiologist    Hyperlipidemia 2017    on lifestyle modifications only       Past Surgical History:   Procedure Laterality Date    HERNIA REPAIR      hernia repair as a baby - unsure of location of hernia    KNEE SURGERY Left     infected blood vessel while in the services    SURGICAL REMOVAL OF LYMPH NODE Left     left groin - removed due to infection    TONSILLECTOMY      TYMPANOSTOMY TUBE PLACEMENT      chronic ear infections as a  child       Family History   Problem Relation Age of Onset    Emphysema Mother     Hypertension Mother     Hyperlipidemia Mother     Diabetes Father     Hypertension Father     Hyperlipidemia Father     No Known Problems Sister     No Known Problems Daughter     No Known Problems Sister        Social History     Socioeconomic History    Marital status:    Occupational History    Occupation: IT/computer work   Tobacco Use    Smoking status: Former Smoker     Packs/day: 1.00     Years: 30.00     Pack years: 30.00     Types: Cigarettes     Quit date: 2016     Years since quittin.7    Smokeless tobacco: Never Used   Substance and Sexual Activity    Alcohol use: Yes     Comment: once every 3 months - 2 to 3 drinks per occasion    Drug use: No       Review of Systems   Constitutional: Negative for activity change, appetite change, fatigue, fever and unexpected weight change.   HENT: Negative for congestion, ear pain, mouth sores, nosebleeds, postnasal drip, rhinorrhea, sinus pressure, sneezing, sore throat, trouble swallowing and voice change.    Eyes: Negative.    Respiratory: Negative for cough, chest tightness and shortness of breath.    Cardiovascular: Negative for chest pain, palpitations and leg swelling.   Gastrointestinal: Negative.  Negative for abdominal pain, blood in stool, constipation, diarrhea, nausea and vomiting.   Endocrine: Negative.    Genitourinary: Negative for difficulty urinating, dysuria, flank pain, hematuria and urgency.   Musculoskeletal: Negative for back pain, gait problem, myalgias and neck pain.   Skin: Negative for color change, rash and wound.   Allergic/Immunologic: Negative for immunocompromised state.   Neurological: Negative for dizziness, tremors, seizures, syncope, speech difficulty and headaches.   Hematological: Negative for adenopathy. Does not bruise/bleed easily.   Psychiatric/Behavioral: Negative for behavioral problems, dysphoric mood, sleep  "disturbance and suicidal ideas. The patient is not nervous/anxious.          Objective:     Vitals:    01/19/22 1350 01/19/22 1413   BP: (!) 120/92 128/86   BP Location: Left arm    Patient Position: Sitting    BP Method: Large (Manual)    Pulse: 85    Resp: 18    Temp: 98.1 °F (36.7 °C)    TempSrc: Temporal    SpO2: 96%    Weight: 104.3 kg (229 lb 15 oz)    Height: 6' 0.44" (1.84 m)           Physical Exam  Constitutional:       General: He is not in acute distress.     Appearance: Normal appearance. He is well-developed. He is obese. He is not toxic-appearing or diaphoretic.      Comments: Body mass index is 30.81 kg/m².       HENT:      Head: Normocephalic and atraumatic.      Right Ear: External ear normal. Tympanic membrane is scarred.      Left Ear: External ear normal. Tympanic membrane is scarred.   Eyes:      General: No scleral icterus.        Right eye: No discharge.         Left eye: No discharge.      Pupils: Pupils are equal, round, and reactive to light.   Neck:      Thyroid: No thyromegaly.      Trachea: No tracheal deviation.   Cardiovascular:      Rate and Rhythm: Normal rate and regular rhythm.      Heart sounds: Normal heart sounds. No murmur heard.      Pulmonary:      Effort: Pulmonary effort is normal. No respiratory distress.      Breath sounds: Normal breath sounds.   Abdominal:      General: There is no distension.      Palpations: Abdomen is soft.   Musculoskeletal:         General: Normal range of motion.      Cervical back: Normal range of motion and neck supple.   Lymphadenopathy:      Cervical: No cervical adenopathy.   Skin:     General: Skin is warm and dry.      Findings: No rash.   Neurological:      Mental Status: He is alert and oriented to person, place, and time.      Coordination: Coordination normal.   Psychiatric:         Mood and Affect: Mood normal.         Behavior: Behavior normal.         Thought Content: Thought content normal.         Judgment: Judgment normal.       "     Assessment:         ICD-10-CM ICD-9-CM   1. Annual physical exam  Z00.00 V70.0   2. Hyperlipidemia, unspecified hyperlipidemia type  E78.5 272.4   3. Erectile dysfunction, unspecified erectile dysfunction type  N52.9 607.84   4. Colon cancer screening  Z12.11 V76.51   5. Screening for deficiency anemia  Z13.0 V78.1   6. Thyroid disorder screen  Z13.29 V77.0   7. Diabetes mellitus screening  Z13.1 V77.1       Plan:       Annual physical exam  -     CBC Auto Differential; Future; Expected date: 01/19/2022  -     Comprehensive Metabolic Panel; Future; Expected date: 01/19/2022  -     Lipid Panel; Future; Expected date: 01/19/2022  -     TSH; Future; Expected date: 01/19/2022    Health Maintenance Summary     Full History      Expand All  Collapse All    Overdue - Colorectal Cancer Screening - ordered with Dr. Arndt  (Colonoscopy - Every 10 Years)  Order placed this encounter  No completion history exists for this topic.     Postponed - Influenza Vaccine - patient refused  (1)  Postponed until 6/30/2022  No completion history exists for this topic.     Postponed - COVID-19 Vaccine- patient refused  (1)  Postponed until 1/19/2023  No completion history exists for this topic.     Ordered - Lipid Panel  (Every 5 Years)  Ordered on 1/19/2022 01/06/2022  Lipid Panel    06/03/2019  Lipid panel    01/23/2018  Lipid panel    10/12/2017  Lipid panel      TETANUS VACCINE  (Every 10 Years)  Next due on 6/14/2029 06/14/2019  Imm Admin: Tdap      Hepatitis C Screening  Completed  01/06/2022  Hepatitis C Antibody      HIV Screening  Completed  01/06/2022  HIV 1/2 Ag/Ab (4th Gen)      Pneumococcal Vaccines (Age 0-64)  (Series Information)  Aged Out  No completion history exists for this topic.         Hyperlipidemia, unspecified hyperlipidemia type  -  Improving with lifestyle modifications.  -     Lipid Panel; Future; Expected date: 01/19/2022    Erectile dysfunction, unspecified erectile dysfunction type  -  cialis prn  -      tadalafiL (CIALIS) 10 MG tablet; Take 1 tablet (10 mg total) by mouth daily as needed for Erectile Dysfunction.  Dispense: 30 tablet; Refill: 5    Colon cancer screening  - ordered with Dr. Arndt  -     Case Request Endoscopy: COLONOSCOPY    Screening for deficiency anemia  -     CBC Auto Differential; Future; Expected date: 01/19/2022    Thyroid disorder screen  -     TSH; Future; Expected date: 01/19/2022    Diabetes mellitus screening  -     Comprehensive Metabolic Panel; Future; Expected date: 01/19/2022      Follow up in about 1 year (around 1/19/2023) for fasting labs and wellness exam.     Patient's Medications   New Prescriptions    No medications on file   Previous Medications    IBUPROFEN (ADVIL,MOTRIN) 400 MG TABLET    Take 400 mg by mouth every 4 (four) hours.   Modified Medications    Modified Medication Previous Medication    TADALAFIL (CIALIS) 10 MG TABLET tadalafiL (CIALIS) 10 MG tablet       Take 1 tablet (10 mg total) by mouth daily as needed for Erectile Dysfunction.    Cialis 10 mg tablet   Take 1 tablet every day by oral route as needed for 10 days.   Discontinued Medications    ALBUTEROL (PROVENTIL/VENTOLIN HFA) 90 MCG/ACTUATION INHALER    Inhale 2 puffs into the lungs every 6 (six) hours as needed for Wheezing. Rescue    MELOXICAM (MOBIC) 15 MG TABLET    meloxicam 15 mg tablet   Take 1 tablet every day by oral route for 30 days.

## 2022-01-20 ENCOUNTER — TELEPHONE (OUTPATIENT)
Dept: ENDOSCOPY | Facility: HOSPITAL | Age: 47
End: 2022-01-20
Payer: COMMERCIAL

## 2022-01-20 DIAGNOSIS — Z01.818 PRE-OP TESTING: ICD-10-CM

## 2022-01-20 NOTE — TELEPHONE ENCOUNTER
Endoscopy Scheduling Questionnaire:         1. Are you taking any blood thinners? No                If Yes  Have you been on them for longer than one year? N/A    2. Have you been diagnosed with Diverticulitis in past three months?  No    3. Are you on dialysis or have Kidney Disease? No    4. Previous Colonoscopy?  No         If yes Do you have a history of colon polyps?  No      6. Are you a diabetic?  No    7. Do you have a history of constipation?  No      Procedure scheduled with Dr. Arndt on  02/01/2022    The prep being used is Golytely     The patient's prep instructions were sent by E-Mail                   Covid Test 1/31/2022

## 2022-01-31 ENCOUNTER — LAB VISIT (OUTPATIENT)
Dept: FAMILY MEDICINE | Facility: CLINIC | Age: 47
End: 2022-01-31
Payer: COMMERCIAL

## 2022-01-31 DIAGNOSIS — Z01.818 PRE-OP TESTING: ICD-10-CM

## 2022-01-31 LAB
SARS-COV-2 RNA RESP QL NAA+PROBE: NOT DETECTED
SARS-COV-2- CYCLE NUMBER: NORMAL

## 2022-01-31 PROCEDURE — U0003 INFECTIOUS AGENT DETECTION BY NUCLEIC ACID (DNA OR RNA); SEVERE ACUTE RESPIRATORY SYNDROME CORONAVIRUS 2 (SARS-COV-2) (CORONAVIRUS DISEASE [COVID-19]), AMPLIFIED PROBE TECHNIQUE, MAKING USE OF HIGH THROUGHPUT TECHNOLOGIES AS DESCRIBED BY CMS-2020-01-R: HCPCS | Performed by: COLON & RECTAL SURGERY

## 2022-01-31 PROCEDURE — U0005 INFEC AGEN DETEC AMPLI PROBE: HCPCS | Performed by: COLON & RECTAL SURGERY

## 2023-01-17 ENCOUNTER — OFFICE VISIT (OUTPATIENT)
Dept: FAMILY MEDICINE | Facility: CLINIC | Age: 48
End: 2023-01-17
Payer: COMMERCIAL

## 2023-01-17 VITALS
SYSTOLIC BLOOD PRESSURE: 118 MMHG | DIASTOLIC BLOOD PRESSURE: 86 MMHG | BODY MASS INDEX: 30.64 KG/M2 | WEIGHT: 226.19 LBS | HEART RATE: 91 BPM | OXYGEN SATURATION: 96 % | HEIGHT: 72 IN | TEMPERATURE: 98 F

## 2023-01-17 DIAGNOSIS — R73.01 IFG (IMPAIRED FASTING GLUCOSE): ICD-10-CM

## 2023-01-17 DIAGNOSIS — Z86.010 HISTORY OF COLON POLYPS: ICD-10-CM

## 2023-01-17 DIAGNOSIS — R41.840 POOR CONCENTRATION: ICD-10-CM

## 2023-01-17 DIAGNOSIS — Z00.00 ANNUAL PHYSICAL EXAM: Primary | ICD-10-CM

## 2023-01-17 DIAGNOSIS — N52.9 ERECTILE DYSFUNCTION, UNSPECIFIED ERECTILE DYSFUNCTION TYPE: ICD-10-CM

## 2023-01-17 DIAGNOSIS — Z13.1 DIABETES MELLITUS SCREENING: ICD-10-CM

## 2023-01-17 DIAGNOSIS — E78.5 HYPERLIPIDEMIA, UNSPECIFIED HYPERLIPIDEMIA TYPE: ICD-10-CM

## 2023-01-17 DIAGNOSIS — E66.09 CLASS 1 OBESITY DUE TO EXCESS CALORIES WITHOUT SERIOUS COMORBIDITY WITH BODY MASS INDEX (BMI) OF 30.0 TO 30.9 IN ADULT: ICD-10-CM

## 2023-01-17 PROBLEM — Z86.0100 HISTORY OF COLON POLYPS: Status: ACTIVE | Noted: 2023-01-17

## 2023-01-17 PROBLEM — E66.811 CLASS 1 OBESITY DUE TO EXCESS CALORIES WITHOUT SERIOUS COMORBIDITY WITH BODY MASS INDEX (BMI) OF 30.0 TO 30.9 IN ADULT: Status: ACTIVE | Noted: 2023-01-17

## 2023-01-17 PROCEDURE — 3008F BODY MASS INDEX DOCD: CPT | Mod: CPTII,S$GLB,, | Performed by: NURSE PRACTITIONER

## 2023-01-17 PROCEDURE — 3079F DIAST BP 80-89 MM HG: CPT | Mod: CPTII,S$GLB,, | Performed by: NURSE PRACTITIONER

## 2023-01-17 PROCEDURE — 1160F PR REVIEW ALL MEDS BY PRESCRIBER/CLIN PHARMACIST DOCUMENTED: ICD-10-PCS | Mod: CPTII,S$GLB,, | Performed by: NURSE PRACTITIONER

## 2023-01-17 PROCEDURE — 3074F SYST BP LT 130 MM HG: CPT | Mod: CPTII,S$GLB,, | Performed by: NURSE PRACTITIONER

## 2023-01-17 PROCEDURE — 99396 PREV VISIT EST AGE 40-64: CPT | Mod: S$GLB,,, | Performed by: NURSE PRACTITIONER

## 2023-01-17 PROCEDURE — 99999 PR PBB SHADOW E&M-EST. PATIENT-LVL III: CPT | Mod: PBBFAC,,, | Performed by: NURSE PRACTITIONER

## 2023-01-17 PROCEDURE — 3074F PR MOST RECENT SYSTOLIC BLOOD PRESSURE < 130 MM HG: ICD-10-PCS | Mod: CPTII,S$GLB,, | Performed by: NURSE PRACTITIONER

## 2023-01-17 PROCEDURE — 1160F RVW MEDS BY RX/DR IN RCRD: CPT | Mod: CPTII,S$GLB,, | Performed by: NURSE PRACTITIONER

## 2023-01-17 PROCEDURE — 3079F PR MOST RECENT DIASTOLIC BLOOD PRESSURE 80-89 MM HG: ICD-10-PCS | Mod: CPTII,S$GLB,, | Performed by: NURSE PRACTITIONER

## 2023-01-17 PROCEDURE — 99999 PR PBB SHADOW E&M-EST. PATIENT-LVL III: ICD-10-PCS | Mod: PBBFAC,,, | Performed by: NURSE PRACTITIONER

## 2023-01-17 PROCEDURE — 1159F MED LIST DOCD IN RCRD: CPT | Mod: CPTII,S$GLB,, | Performed by: NURSE PRACTITIONER

## 2023-01-17 PROCEDURE — 3008F PR BODY MASS INDEX (BMI) DOCUMENTED: ICD-10-PCS | Mod: CPTII,S$GLB,, | Performed by: NURSE PRACTITIONER

## 2023-01-17 PROCEDURE — 99396 PR PREVENTIVE VISIT,EST,40-64: ICD-10-PCS | Mod: S$GLB,,, | Performed by: NURSE PRACTITIONER

## 2023-01-17 PROCEDURE — 1159F PR MEDICATION LIST DOCUMENTED IN MEDICAL RECORD: ICD-10-PCS | Mod: CPTII,S$GLB,, | Performed by: NURSE PRACTITIONER

## 2023-01-17 RX ORDER — TADALAFIL 10 MG/1
10 TABLET ORAL DAILY PRN
Qty: 30 TABLET | Refills: 5 | Status: SHIPPED | OUTPATIENT
Start: 2023-01-17

## 2023-01-17 NOTE — PROGRESS NOTES
Subjective:       Patient ID: Nicolas Marx is a 47 y.o. male.    Chief Complaint: Annual Exam (Discuss getting eval for ADHD)    HPI    Patient is a 47-year-old white male with hyperlipidemia, bilateral hearing loss, and primary erectile dysfunction treated by urologist in the past that is here today for annual physical exam. He did NOT have fasting labs for screening - will order today.  Patient also requesting ADHD evaluation.    Hyperlipidemia  on lifestyle modifications only.  Based on LAST YEAR numbers:  The 10-year ASCVD risk score (Milagros MORRIS, et al., 2019) is: 3.8%    Values used to calculate the score:      Age: 47 years      Sex: Male      Is Non- : No      Diabetic: No      Tobacco smoker: No      Systolic Blood Pressure: 118 mmHg      Is BP treated: No      HDL Cholesterol: 33 mg/dL      Total Cholesterol: 207 mg/dL    WILL GET UPDATED LAB RESULTS and call with results.    Erectile dysfunction   treated by Dr. Quintanilla in past.   Takes Cialis 10 mg prn.       Chronic bilateral hearing loss over 50% to ears   was evaluated by audiologist in the past.     Impaired Fasting Glucose   last year  Will get updated labs with HgbA1C with future lab draw    Personal History of Colon Polyps  Colonoscopy 2/3/22 - repeat in 5 years - Dr. Arndt    Health maintenance:  Fasting labs this week  -  declined covid and flu vaccines    Health Maintenance Summary     Full History      Expand All  Collapse All    Scheduled - Hemoglobin A1c (Prediabetes)  (Yearly)  Scheduled for 1/24/2023  No completion history exists for this topic.     Postponed - COVID-19 Vaccine  (1)  Postponed until 1/19/2023  No completion history exists for this topic.     Postponed - Influenza Vaccine  (1)  Postponed until 6/30/2023  No completion history exists for this topic.     Scheduled - Lipid Panel  (Every 5 Years)  Scheduled for 1/24/2023 01/06/2022  Lipid Panel    06/03/2019  Lipid panel    01/23/2018   Lipid panel    10/12/2017  Lipid panel      TETANUS VACCINE  (Every 10 Years)  Next due on 6/14/2029 06/14/2019  Imm Admin: Tdap      Colorectal Cancer Screening  (Colonoscopy - Every 10 Years)  Next due on 2/3/2032  02/03/2022  Colonoscopy    02/03/2022  Surgical Procedure: COLONOSCOPY      Hepatitis C Screening  Completed  01/06/2022  Hepatitis C Ab component of Hepatitis C Antibody      HIV Screening  Completed  01/06/2022  HIV 1/2 Ag/Ab (4th Gen)      Pneumococcal Vaccines (Age 0-64)  (Series Information)  Aged Out  No completion history exists for this topic.         Review of Systems   Constitutional:  Negative for activity change, appetite change, fatigue, fever and unexpected weight change.   HENT:  Negative for congestion, ear pain, mouth sores, nosebleeds, postnasal drip, rhinorrhea, sinus pressure, sneezing, sore throat, trouble swallowing and voice change.    Eyes: Negative.    Respiratory:  Negative for cough, chest tightness and shortness of breath.    Cardiovascular:  Negative for chest pain, palpitations and leg swelling.   Gastrointestinal: Negative.  Negative for abdominal pain, blood in stool, constipation, diarrhea, nausea and vomiting.   Endocrine: Negative.    Genitourinary:  Negative for difficulty urinating, dysuria, flank pain, hematuria and urgency.   Musculoskeletal:  Negative for arthralgias, back pain, gait problem, joint swelling, myalgias and neck pain.   Skin:  Negative for color change, rash and wound.   Allergic/Immunologic: Negative for immunocompromised state.   Neurological:  Negative for dizziness, tremors, seizures, syncope, speech difficulty and headaches.   Hematological:  Negative for adenopathy. Does not bruise/bleed easily.   Psychiatric/Behavioral:  Positive for decreased concentration. Negative for behavioral problems, dysphoric mood, sleep disturbance and suicidal ideas. The patient is not nervous/anxious.        Objective:     Vitals:    01/17/23 1625   BP: 118/86    BP Location: Left arm   Patient Position: Sitting   BP Method: Large (Manual)   Pulse: 91   Temp: 97.7 °F (36.5 °C)   TempSrc: Temporal   SpO2: 96%   Weight: 102.6 kg (226 lb 3.1 oz)   Height: 6' (1.829 m)          Physical Exam  Constitutional:       General: He is not in acute distress.     Appearance: Normal appearance. He is well-developed. He is obese. He is not toxic-appearing or diaphoretic.      Comments: Body mass index is 30.68 kg/m².         HENT:      Head: Normocephalic and atraumatic.      Right Ear: External ear normal. Tympanic membrane is scarred.      Left Ear: External ear normal. Tympanic membrane is scarred.   Eyes:      General: No scleral icterus.        Right eye: No discharge.         Left eye: No discharge.      Pupils: Pupils are equal, round, and reactive to light.   Neck:      Thyroid: No thyromegaly.      Trachea: No tracheal deviation.   Cardiovascular:      Rate and Rhythm: Normal rate and regular rhythm.      Heart sounds: Normal heart sounds. No murmur heard.  Pulmonary:      Effort: Pulmonary effort is normal. No respiratory distress.      Breath sounds: Normal breath sounds.   Abdominal:      General: There is no distension.      Palpations: Abdomen is soft.   Musculoskeletal:         General: Normal range of motion.      Cervical back: Normal range of motion and neck supple.   Lymphadenopathy:      Cervical: No cervical adenopathy.   Skin:     General: Skin is warm and dry.      Findings: No rash.   Neurological:      Mental Status: He is alert and oriented to person, place, and time.      Coordination: Coordination normal.   Psychiatric:         Mood and Affect: Mood normal.         Behavior: Behavior normal.         Thought Content: Thought content normal.         Judgment: Judgment normal.         Assessment:         ICD-10-CM ICD-9-CM   1. Annual physical exam  Z00.00 V70.0   2. Class 1 obesity due to excess calories without serious comorbidity with body mass index (BMI) of  30.0 to 30.9 in adult  E66.09 278.00    Z68.30 V85.30   3. Hyperlipidemia, unspecified hyperlipidemia type  E78.5 272.4   4. IFG (impaired fasting glucose)  R73.01 790.21   5. Erectile dysfunction, unspecified erectile dysfunction type  N52.9 607.84   6. History of colon polyps  Z86.010 V12.72   7. Poor concentration  R41.840 799.51   8. Diabetes mellitus screening  Z13.1 V77.1       Plan:       Annual physical exam  Fasting labs this week for screening - will call with results and follow up    Class 1 obesity due to excess calories without serious comorbidity with body mass index (BMI) of 30.0 to 30.9 in adult    Hyperlipidemia, unspecified hyperlipidemia type  Get updated labs and will call to discuss    IFG (impaired fasting glucose)  Need updated labs to re-evaluate    Erectile dysfunction, unspecified erectile dysfunction type  Cialis prn  -     tadalafiL (CIALIS) 10 MG tablet; Take 1 tablet (10 mg total) by mouth daily as needed for Erectile Dysfunction.  Dispense: 30 tablet; Refill: 5    History of colon polyps  Repeat in 5 years.    Poor concentration  Gave numbers to [facility], [facility] and [facility] to call for evaluation.    Diabetes mellitus screening  -     Hemoglobin A1C; Future; Expected date: 01/17/2023      Follow up for pending lab results..     Patient's Medications   New Prescriptions    No medications on file   Previous Medications    IBUPROFEN (ADVIL,MOTRIN) 400 MG TABLET    Take 400 mg by mouth every 4 (four) hours.   Modified Medications    Modified Medication Previous Medication    TADALAFIL (CIALIS) 10 MG TABLET tadalafiL (CIALIS) 10 MG tablet       Take 1 tablet (10 mg total) by mouth daily as needed for Erectile Dysfunction.    Take 1 tablet (10 mg total) by mouth daily as needed for Erectile Dysfunction.   Discontinued Medications    No medications on file       Past Medical History:   Diagnosis Date    ED (erectile dysfunction)      treated by Dr. Quintanilla    Hearing loss associated with syndrome, bilateral     bilateral hearing loss with chronic tinnitus -tested by audiologist    Hyperlipidemia 2017    on lifestyle modifications only       Past Surgical History:   Procedure Laterality Date    COLONOSCOPY N/A 2/3/2022    Procedure: COLONOSCOPY;  Surgeon: JULIO C Arndt MD;  [facility]: [facility];  Service: Endoscopy;  Laterality: N/A;  COLONOSCOPY Golytely Covid test 2022    HERNIA REPAIR      hernia repair as a baby - unsure of location of hernia    KNEE SURGERY Left     infected blood vessel while in the services    SURGICAL REMOVAL OF LYMPH NODE Left     left groin - removed due to infection    TONSILLECTOMY      TYMPANOSTOMY TUBE PLACEMENT      chronic ear infections as a child       Family History   Problem Relation Age of Onset    Emphysema Mother     Hypertension Mother     Hyperlipidemia Mother     Diabetes Father     Hypertension Father     Hyperlipidemia Father     No Known Problems Sister     No Known Problems Daughter     No Known Problems Sister        Social History     Socioeconomic History    Marital status:    Occupational History    Occupation: IT/computer work   Tobacco Use    Smoking status: Former     Packs/day: 1.00     Years: 30.00     Pack years: 30.00     Types: Cigarettes     Quit date: 2016     Years since quittin.7    Smokeless tobacco: Never   Substance and Sexual Activity    Alcohol use: Yes     Comment: once every 3 months - 2 to 3 drinks per occasion    Drug use: No

## 2023-01-24 ENCOUNTER — LAB VISIT (OUTPATIENT)
Dept: LAB | Facility: HOSPITAL | Age: 48
End: 2023-01-24
Attending: NURSE PRACTITIONER
Payer: COMMERCIAL

## 2023-01-24 DIAGNOSIS — Z13.0 SCREENING FOR DEFICIENCY ANEMIA: ICD-10-CM

## 2023-01-24 DIAGNOSIS — E78.5 HYPERLIPIDEMIA, UNSPECIFIED HYPERLIPIDEMIA TYPE: ICD-10-CM

## 2023-01-24 DIAGNOSIS — Z00.00 ANNUAL PHYSICAL EXAM: ICD-10-CM

## 2023-01-24 DIAGNOSIS — Z13.29 THYROID DISORDER SCREEN: ICD-10-CM

## 2023-01-24 DIAGNOSIS — Z13.1 DIABETES MELLITUS SCREENING: ICD-10-CM

## 2023-01-24 LAB
ALBUMIN SERPL BCP-MCNC: 4.8 G/DL (ref 3.5–5.2)
ALP SERPL-CCNC: 66 U/L (ref 38–126)
ALT SERPL W/O P-5'-P-CCNC: 21 U/L (ref 10–44)
ANION GAP SERPL CALC-SCNC: 7 MMOL/L (ref 8–16)
AST SERPL-CCNC: 22 U/L (ref 15–46)
BASOPHILS # BLD AUTO: 0.03 K/UL (ref 0–0.2)
BASOPHILS NFR BLD: 0.6 % (ref 0–1.9)
BILIRUB SERPL-MCNC: 0.5 MG/DL (ref 0.1–1)
CALCIUM SERPL-MCNC: 9.2 MG/DL (ref 8.7–10.5)
CHLORIDE SERPL-SCNC: 108 MMOL/L (ref 95–110)
CHOLEST SERPL-MCNC: 229 MG/DL (ref 120–199)
CHOLEST/HDLC SERPL: 5.6 {RATIO} (ref 2–5)
CO2 SERPL-SCNC: 27 MMOL/L (ref 23–29)
CREAT SERPL-MCNC: 1.03 MG/DL (ref 0.5–1.4)
DIFFERENTIAL METHOD: NORMAL
EOSINOPHIL # BLD AUTO: 0.1 K/UL (ref 0–0.5)
EOSINOPHIL NFR BLD: 2.7 % (ref 0–8)
ERYTHROCYTE [DISTWIDTH] IN BLOOD BY AUTOMATED COUNT: 12.7 % (ref 11.5–14.5)
EST. GFR  (NO RACE VARIABLE): >60 ML/MIN/1.73 M^2
ESTIMATED AVG GLUCOSE: 114 MG/DL (ref 68–131)
GLUCOSE SERPL-MCNC: 106 MG/DL (ref 70–110)
HBA1C MFR BLD: 5.6 % (ref 4–5.6)
HCT VFR BLD AUTO: 46.4 % (ref 40–54)
HDLC SERPL-MCNC: 41 MG/DL (ref 40–75)
HDLC SERPL: 17.9 % (ref 20–50)
HGB BLD-MCNC: 15.2 G/DL (ref 14–18)
IMM GRANULOCYTES # BLD AUTO: 0.01 K/UL (ref 0–0.04)
IMM GRANULOCYTES NFR BLD AUTO: 0.2 % (ref 0–0.5)
LDLC SERPL CALC-MCNC: 163.4 MG/DL (ref 63–159)
LYMPHOCYTES # BLD AUTO: 1.9 K/UL (ref 1–4.8)
LYMPHOCYTES NFR BLD: 35.6 % (ref 18–48)
MCH RBC QN AUTO: 29.3 PG (ref 27–31)
MCHC RBC AUTO-ENTMCNC: 32.8 G/DL (ref 32–36)
MCV RBC AUTO: 90 FL (ref 82–98)
MONOCYTES # BLD AUTO: 0.5 K/UL (ref 0.3–1)
MONOCYTES NFR BLD: 9 % (ref 4–15)
NEUTROPHILS # BLD AUTO: 2.7 K/UL (ref 1.8–7.7)
NEUTROPHILS NFR BLD: 51.9 % (ref 38–73)
NONHDLC SERPL-MCNC: 188 MG/DL
NRBC BLD-RTO: 0 /100 WBC
PLATELET # BLD AUTO: 213 K/UL (ref 150–450)
PMV BLD AUTO: 10.9 FL (ref 9.2–12.9)
POTASSIUM SERPL-SCNC: 4.9 MMOL/L (ref 3.5–5.1)
PROT SERPL-MCNC: 7.2 G/DL (ref 6–8.4)
RBC # BLD AUTO: 5.18 M/UL (ref 4.6–6.2)
SODIUM SERPL-SCNC: 142 MMOL/L (ref 136–145)
TRIGL SERPL-MCNC: 123 MG/DL (ref 30–150)
TSH SERPL DL<=0.005 MIU/L-ACNC: 1.39 UIU/ML (ref 0.4–4)
UUN UR-MCNC: 19 MG/DL (ref 2–20)
WBC # BLD AUTO: 5.23 K/UL (ref 3.9–12.7)

## 2023-01-24 PROCEDURE — 83036 HEMOGLOBIN GLYCOSYLATED A1C: CPT | Performed by: NURSE PRACTITIONER

## 2023-01-24 PROCEDURE — 84443 ASSAY THYROID STIM HORMONE: CPT | Mod: PO | Performed by: NURSE PRACTITIONER

## 2023-01-24 PROCEDURE — 36415 COLL VENOUS BLD VENIPUNCTURE: CPT | Mod: PO | Performed by: NURSE PRACTITIONER

## 2023-01-24 PROCEDURE — 85025 COMPLETE CBC W/AUTO DIFF WBC: CPT | Mod: PO | Performed by: NURSE PRACTITIONER

## 2023-01-24 PROCEDURE — 80053 COMPREHEN METABOLIC PANEL: CPT | Mod: PO | Performed by: NURSE PRACTITIONER

## 2023-01-24 PROCEDURE — 80061 LIPID PANEL: CPT | Performed by: NURSE PRACTITIONER

## 2023-01-25 ENCOUNTER — TELEPHONE (OUTPATIENT)
Dept: FAMILY MEDICINE | Facility: CLINIC | Age: 48
End: 2023-01-25
Payer: COMMERCIAL

## 2023-01-25 DIAGNOSIS — Z00.00 ENCOUNTER FOR BLOOD TEST FOR ROUTINE GENERAL PHYSICAL EXAMINATION: Primary | ICD-10-CM

## 2023-01-25 DIAGNOSIS — E78.5 HYPERLIPIDEMIA, UNSPECIFIED HYPERLIPIDEMIA TYPE: ICD-10-CM

## 2023-01-25 DIAGNOSIS — Z13.1 DIABETES MELLITUS SCREENING: ICD-10-CM

## 2023-01-25 DIAGNOSIS — Z13.0 SCREENING FOR DEFICIENCY ANEMIA: ICD-10-CM

## 2023-01-25 DIAGNOSIS — Z13.29 THYROID DISORDER SCREEN: ICD-10-CM

## 2023-01-25 NOTE — TELEPHONE ENCOUNTER
Patient advised of message, I went ahead and schedule his Wellness and labs for next year, can you order and link labs to appt.

## 2023-01-25 NOTE — TELEPHONE ENCOUNTER
Call patient and advise:    His cholesterol levels are high but his cardiovascular risk is < 5% so need for medication. The rest of wellness labs are within range.  Advise patient to repeat fasting labs and WELLNESS EXAM in 1 year but to make sure to get labs FIRST and then visit so we have results at time of exam to review together.    The 10-year ASCVD risk score (Milagros MORRIS, et al., 2019) is: 3.4%    Values used to calculate the score:      Age: 47 years      Sex: Male      Is Non- : No      Diabetic: No      Tobacco smoker: No      Systolic Blood Pressure: 118 mmHg      Is BP treated: No      HDL Cholesterol: 41 mg/dL      Total Cholesterol: 229 mg/dL

## 2024-01-18 ENCOUNTER — LAB VISIT (OUTPATIENT)
Dept: LAB | Facility: HOSPITAL | Age: 49
End: 2024-01-18
Attending: NURSE PRACTITIONER
Payer: COMMERCIAL

## 2024-01-18 DIAGNOSIS — E78.5 HYPERLIPIDEMIA, UNSPECIFIED HYPERLIPIDEMIA TYPE: ICD-10-CM

## 2024-01-18 DIAGNOSIS — Z00.00 ENCOUNTER FOR BLOOD TEST FOR ROUTINE GENERAL PHYSICAL EXAMINATION: ICD-10-CM

## 2024-01-18 DIAGNOSIS — Z13.29 THYROID DISORDER SCREEN: ICD-10-CM

## 2024-01-18 DIAGNOSIS — Z13.0 SCREENING FOR DEFICIENCY ANEMIA: ICD-10-CM

## 2024-01-18 DIAGNOSIS — Z13.1 DIABETES MELLITUS SCREENING: ICD-10-CM

## 2024-01-18 LAB
ALBUMIN SERPL BCP-MCNC: 4.1 G/DL (ref 3.5–5.2)
ALP SERPL-CCNC: 70 U/L (ref 38–126)
ALT SERPL W/O P-5'-P-CCNC: 23 U/L (ref 10–44)
ANION GAP SERPL CALC-SCNC: 8 MMOL/L (ref 8–16)
AST SERPL-CCNC: 20 U/L (ref 15–46)
BASOPHILS # BLD AUTO: 0.05 K/UL (ref 0–0.2)
BASOPHILS NFR BLD: 0.9 % (ref 0–1.9)
BILIRUB SERPL-MCNC: 0.4 MG/DL (ref 0.1–1)
CALCIUM SERPL-MCNC: 9.5 MG/DL (ref 8.7–10.5)
CHLORIDE SERPL-SCNC: 106 MMOL/L (ref 95–110)
CHOLEST SERPL-MCNC: 232 MG/DL (ref 120–199)
CHOLEST/HDLC SERPL: 6.1 {RATIO} (ref 2–5)
CO2 SERPL-SCNC: 27 MMOL/L (ref 23–29)
CREAT SERPL-MCNC: 1.11 MG/DL (ref 0.5–1.4)
DIFFERENTIAL METHOD BLD: NORMAL
EOSINOPHIL # BLD AUTO: 0.2 K/UL (ref 0–0.5)
EOSINOPHIL NFR BLD: 2.8 % (ref 0–8)
ERYTHROCYTE [DISTWIDTH] IN BLOOD BY AUTOMATED COUNT: 13 % (ref 11.5–14.5)
EST. GFR  (NO RACE VARIABLE): >60 ML/MIN/1.73 M^2
ESTIMATED AVG GLUCOSE: 114 MG/DL (ref 68–131)
GLUCOSE SERPL-MCNC: 113 MG/DL (ref 70–110)
HBA1C MFR BLD: 5.6 % (ref 4–5.6)
HCT VFR BLD AUTO: 45.7 % (ref 40–54)
HDLC SERPL-MCNC: 38 MG/DL (ref 40–75)
HDLC SERPL: 16.4 % (ref 20–50)
HGB BLD-MCNC: 15.2 G/DL (ref 14–18)
IMM GRANULOCYTES # BLD AUTO: 0.01 K/UL (ref 0–0.04)
IMM GRANULOCYTES NFR BLD AUTO: 0.2 % (ref 0–0.5)
LDLC SERPL CALC-MCNC: 168.4 MG/DL (ref 63–159)
LYMPHOCYTES # BLD AUTO: 2 K/UL (ref 1–4.8)
LYMPHOCYTES NFR BLD: 34.5 % (ref 18–48)
MCH RBC QN AUTO: 29.5 PG (ref 27–31)
MCHC RBC AUTO-ENTMCNC: 33.3 G/DL (ref 32–36)
MCV RBC AUTO: 89 FL (ref 82–98)
MONOCYTES # BLD AUTO: 0.5 K/UL (ref 0.3–1)
MONOCYTES NFR BLD: 8.2 % (ref 4–15)
NEUTROPHILS # BLD AUTO: 3.1 K/UL (ref 1.8–7.7)
NEUTROPHILS NFR BLD: 53.4 % (ref 38–73)
NONHDLC SERPL-MCNC: 194 MG/DL
NRBC BLD-RTO: 0 /100 WBC
PLATELET # BLD AUTO: 222 K/UL (ref 150–450)
PMV BLD AUTO: 10.4 FL (ref 9.2–12.9)
POTASSIUM SERPL-SCNC: 4.5 MMOL/L (ref 3.5–5.1)
PROT SERPL-MCNC: 7.1 G/DL (ref 6–8.4)
RBC # BLD AUTO: 5.16 M/UL (ref 4.6–6.2)
SODIUM SERPL-SCNC: 141 MMOL/L (ref 136–145)
TRIGL SERPL-MCNC: 128 MG/DL (ref 30–150)
TSH SERPL DL<=0.005 MIU/L-ACNC: 1.66 UIU/ML (ref 0.4–4)
UUN UR-MCNC: 19 MG/DL (ref 2–20)
WBC # BLD AUTO: 5.71 K/UL (ref 3.9–12.7)

## 2024-01-18 PROCEDURE — 36415 COLL VENOUS BLD VENIPUNCTURE: CPT | Mod: PN | Performed by: NURSE PRACTITIONER

## 2024-01-18 PROCEDURE — 83036 HEMOGLOBIN GLYCOSYLATED A1C: CPT | Performed by: NURSE PRACTITIONER

## 2024-01-18 PROCEDURE — 80061 LIPID PANEL: CPT | Performed by: NURSE PRACTITIONER

## 2024-01-18 PROCEDURE — 84443 ASSAY THYROID STIM HORMONE: CPT | Mod: PN | Performed by: NURSE PRACTITIONER

## 2024-01-18 PROCEDURE — 85025 COMPLETE CBC W/AUTO DIFF WBC: CPT | Mod: PN | Performed by: NURSE PRACTITIONER

## 2024-01-18 PROCEDURE — 80053 COMPREHEN METABOLIC PANEL: CPT | Mod: PN | Performed by: NURSE PRACTITIONER

## 2024-01-19 ENCOUNTER — OFFICE VISIT (OUTPATIENT)
Dept: FAMILY MEDICINE | Facility: CLINIC | Age: 49
End: 2024-01-19
Payer: COMMERCIAL

## 2024-01-19 VITALS
SYSTOLIC BLOOD PRESSURE: 138 MMHG | HEART RATE: 80 BPM | HEIGHT: 74 IN | BODY MASS INDEX: 31.11 KG/M2 | WEIGHT: 242.38 LBS | TEMPERATURE: 97 F | DIASTOLIC BLOOD PRESSURE: 88 MMHG | OXYGEN SATURATION: 98 %

## 2024-01-19 DIAGNOSIS — Z86.010 HISTORY OF COLON POLYPS: ICD-10-CM

## 2024-01-19 DIAGNOSIS — Z00.00 ANNUAL PHYSICAL EXAM: Primary | ICD-10-CM

## 2024-01-19 DIAGNOSIS — R73.01 IFG (IMPAIRED FASTING GLUCOSE): ICD-10-CM

## 2024-01-19 DIAGNOSIS — N52.9 ERECTILE DYSFUNCTION, UNSPECIFIED ERECTILE DYSFUNCTION TYPE: ICD-10-CM

## 2024-01-19 DIAGNOSIS — E78.2 MIXED HYPERLIPIDEMIA: ICD-10-CM

## 2024-01-19 DIAGNOSIS — E66.09 CLASS 1 OBESITY DUE TO EXCESS CALORIES WITHOUT SERIOUS COMORBIDITY WITH BODY MASS INDEX (BMI) OF 31.0 TO 31.9 IN ADULT: ICD-10-CM

## 2024-01-19 PROCEDURE — 1160F RVW MEDS BY RX/DR IN RCRD: CPT | Mod: CPTII,S$GLB,, | Performed by: NURSE PRACTITIONER

## 2024-01-19 PROCEDURE — 99396 PREV VISIT EST AGE 40-64: CPT | Mod: S$GLB,,, | Performed by: NURSE PRACTITIONER

## 2024-01-19 PROCEDURE — 3044F HG A1C LEVEL LT 7.0%: CPT | Mod: CPTII,S$GLB,, | Performed by: NURSE PRACTITIONER

## 2024-01-19 PROCEDURE — 99999 PR PBB SHADOW E&M-EST. PATIENT-LVL IV: CPT | Mod: PBBFAC,,, | Performed by: NURSE PRACTITIONER

## 2024-01-19 PROCEDURE — 3079F DIAST BP 80-89 MM HG: CPT | Mod: CPTII,S$GLB,, | Performed by: NURSE PRACTITIONER

## 2024-01-19 PROCEDURE — 1159F MED LIST DOCD IN RCRD: CPT | Mod: CPTII,S$GLB,, | Performed by: NURSE PRACTITIONER

## 2024-01-19 PROCEDURE — 3075F SYST BP GE 130 - 139MM HG: CPT | Mod: CPTII,S$GLB,, | Performed by: NURSE PRACTITIONER

## 2024-01-19 PROCEDURE — 3008F BODY MASS INDEX DOCD: CPT | Mod: CPTII,S$GLB,, | Performed by: NURSE PRACTITIONER

## 2024-01-19 RX ORDER — ROSUVASTATIN CALCIUM 10 MG/1
10 TABLET, COATED ORAL DAILY
Qty: 90 TABLET | Refills: 0 | Status: SHIPPED | OUTPATIENT
Start: 2024-01-19 | End: 2024-04-12 | Stop reason: SDUPTHER

## 2024-01-19 NOTE — PROGRESS NOTES
Subjective:       Patient ID: Nicolas Marx is a 48 y.o. male.    Chief Complaint: Annual Exam    HPI    Patient is a 48-year-old white male with hyperlipidemia, bilateral hearing loss, and primary erectile dysfunction treated by urologist in the past that is here today for annual physical exam with fasting lab results.    Hyperlipidemia  on lifestyle modifications only.  The 10-year ASCVD risk score (Milagros MORRIS, et al., 2019) is: 5.5%    Values used to calculate the score:      Age: 48 years      Sex: Male      Is Non- : No      Diabetic: No      Tobacco smoker: No      Systolic Blood Pressure: 138 mmHg      Is BP treated: No      HDL Cholesterol: 38 mg/dL      Total Cholesterol: 232 mg/dL   Start Rosuvastatin 10 mg daily  Recheck in 3 months        Erectile dysfunction   treated by Dr. Quintanilla in past.   Takes Cialis 10 mg prn.    Stable and effective      Chronic bilateral hearing loss over 50% to ears   was evaluated by audiologist in the past.     Impaired Fasting Glucose   with HgbA1C 5.6%  Advised on lifestyle modifications  Recheck in 3 months when checking cholesterol         Obesity  Body mass index is 31.44 kg/m².  Working on lifestyle modficatons    Personal History of Colon Polyps  Colonoscopy 2/3/22 - repeat in 5 years - Dr. Arndt    Wellness labs:   CBC within normal limits   CMP with impaired fasting glucose 113, kidney and liver function are normal   Cholesterol discussed above   TSH is within normal limits   Hemoglobin A1c 5.6%     Health maintenance:   Declined COVID and flu vaccines   Colonoscopy up-to-date    Lab Visit on 01/18/2024   Component Date Value Ref Range Status    WBC 01/18/2024 5.71  3.90 - 12.70 K/uL Final    RBC 01/18/2024 5.16  4.60 - 6.20 M/uL Final    Hemoglobin 01/18/2024 15.2  14.0 - 18.0 g/dL Final    Hematocrit 01/18/2024 45.7  40.0 - 54.0 % Final    MCV 01/18/2024 89  82 - 98 fL Final    MCH 01/18/2024 29.5  27.0 - 31.0 pg Final    MCHC  01/18/2024 33.3  32.0 - 36.0 g/dL Final    RDW 01/18/2024 13.0  11.5 - 14.5 % Final    Platelets 01/18/2024 222  150 - 450 K/uL Final    MPV 01/18/2024 10.4  9.2 - 12.9 fL Final    Immature Granulocytes 01/18/2024 0.2  0.0 - 0.5 % Final    Gran # (ANC) 01/18/2024 3.1  1.8 - 7.7 K/uL Final    Immature Grans (Abs) 01/18/2024 0.01  0.00 - 0.04 K/uL Final    Comment: Mild elevation in immature granulocytes is non specific and   can be seen in a variety of conditions including stress response,   acute inflammation, trauma and pregnancy. Correlation with other   laboratory and clinical findings is essential.      Lymph # 01/18/2024 2.0  1.0 - 4.8 K/uL Final    Mono # 01/18/2024 0.5  0.3 - 1.0 K/uL Final    Eos # 01/18/2024 0.2  0.0 - 0.5 K/uL Final    Baso # 01/18/2024 0.05  0.00 - 0.20 K/uL Final    nRBC 01/18/2024 0  0 /100 WBC Final    Gran % 01/18/2024 53.4  38.0 - 73.0 % Final    Lymph % 01/18/2024 34.5  18.0 - 48.0 % Final    Mono % 01/18/2024 8.2  4.0 - 15.0 % Final    Eosinophil % 01/18/2024 2.8  0.0 - 8.0 % Final    Basophil % 01/18/2024 0.9  0.0 - 1.9 % Final    Differential Method 01/18/2024 Automated   Final    Sodium 01/18/2024 141  136 - 145 mmol/L Final    Potassium 01/18/2024 4.5  3.5 - 5.1 mmol/L Final    Chloride 01/18/2024 106  95 - 110 mmol/L Final    CO2 01/18/2024 27  23 - 29 mmol/L Final    Glucose 01/18/2024 113 (H)  70 - 110 mg/dL Final    BUN 01/18/2024 19  2 - 20 mg/dL Final    Creatinine 01/18/2024 1.11  0.50 - 1.40 mg/dL Final    Calcium 01/18/2024 9.5  8.7 - 10.5 mg/dL Final    Total Protein 01/18/2024 7.1  6.0 - 8.4 g/dL Final    Albumin 01/18/2024 4.1  3.5 - 5.2 g/dL Final    Total Bilirubin 01/18/2024 0.4  0.1 - 1.0 mg/dL Final    Comment: For infants and newborns, interpretation of results should be based  on gestational age, weight and in agreement with clinical  observations.    Premature Infant recommended reference ranges:  Up to 24 hours.............<8.0 mg/dL  Up to 48  hours............<12.0 mg/dL  3-5 days..................<15.0 mg/dL  6-29 days.................<15.0 mg/dL      Alkaline Phosphatase 01/18/2024 70  38 - 126 U/L Final    AST 01/18/2024 20  15 - 46 U/L Final    ALT 01/18/2024 23  10 - 44 U/L Final    Anion Gap 01/18/2024 8  8 - 16 mmol/L Final    eGFR 01/18/2024 >60.0  >60 mL/min/1.73 m^2 Final    Hemoglobin A1C 01/18/2024 5.6  4.0 - 5.6 % Final    Comment: ADA Screening Guidelines:  5.7-6.4%  Consistent with prediabetes  >or=6.5%  Consistent with diabetes    High levels of fetal hemoglobin interfere with the HbA1C  assay. Heterozygous hemoglobin variants (HbS, HgC, etc)do  not significantly interfere with this assay.   However, presence of multiple variants may affect accuracy.      Estimated Avg Glucose 01/18/2024 114  68 - 131 mg/dL Final    TSH 01/18/2024 1.660  0.400 - 4.000 uIU/mL Final    Comment: Warning:  Heterophilic antibodies in serum or plasma of   certain individuals are known to cause interference with   immunoassays. These antibodies may be present in blood samples   from individuals regularly exposed to animal or who have been   treated with animal products.     Patients taking high doses of supplemental biotin may have  negatively biased results.       Cholesterol 01/18/2024 232 (H)  120 - 199 mg/dL Final    Comment: The National Cholesterol Education Program (NCEP) has set the  following guidelines (reference ranges) for Cholesterol:  Optimal.....................<200 mg/dL  Borderline High.............200-239 mg/dL  High........................> or = 240 mg/dL      Triglycerides 01/18/2024 128  30 - 150 mg/dL Final    Comment: The National Cholesterol Education Program (NCEP) has set the  following guidelines (reference values) for triglycerides:  Normal......................<150 mg/dL  Borderline High.............150-199 mg/dL  High........................200-499 mg/dL      HDL 01/18/2024 38 (L)  40 - 75 mg/dL Final    Comment: The National  "Cholesterol Education Program (NCEP) has set the  following guidelines (reference values) for HDL Cholesterol:  Low...............<40 mg/dL  Optimal...........>60 mg/dL      LDL Cholesterol 01/18/2024 168.4 (H)  63.0 - 159.0 mg/dL Final    Comment: The National Cholesterol Education Program (NCEP) has set the  following guidelines (reference values) for LDL Cholesterol:  Optimal.......................<130 mg/dL  Borderline High...............130-159 mg/dL  High..........................160-189 mg/dL  Very High.....................>190 mg/dL      HDL/Cholesterol Ratio 01/18/2024 16.4 (L)  20.0 - 50.0 % Final    Total Cholesterol/HDL Ratio 01/18/2024 6.1 (H)  2.0 - 5.0 Final    Non-HDL Cholesterol 01/18/2024 194  mg/dL Final    Comment: Risk category and Non-HDL cholesterol goals:  Coronary heart disease (CHD)or equivalent (10-year risk of CHD >20%):  Non-HDL cholesterol goal     <130 mg/dL  Two or more CHD risk factors and 10-year risk of CHD <= 20%:  Non-HDL cholesterol goal     <160 mg/dL  0 to 1 CHD risk factor:  Non-HDL cholesterol goal     <190 mg/dL         Review of Systems   HENT: Negative.     Respiratory: Negative.     Cardiovascular: Negative.    Gastrointestinal: Negative.          Objective:     Vitals:    01/19/24 0733 01/19/24 0745   BP: (!) 136/100 138/88   BP Location: Right arm    Patient Position: Sitting    BP Method: Large (Manual)    Pulse: 80    Temp: 97.2 °F (36.2 °C)    TempSrc: Temporal    SpO2: 98%    Weight: 109.9 kg (242 lb 6.3 oz)    Height: 6' 1.62" (1.87 m)           Physical Exam  Constitutional:       General: He is not in acute distress.     Appearance: Normal appearance. He is well-developed. He is obese. He is not toxic-appearing or diaphoretic.      Comments: Body mass index is 31.44 kg/m².  .         HENT:      Head: Normocephalic and atraumatic.      Right Ear: External ear normal. Tympanic membrane is scarred.      Left Ear: External ear normal. Tympanic membrane is scarred. "   Eyes:      General: No scleral icterus.        Right eye: No discharge.         Left eye: No discharge.      Pupils: Pupils are equal, round, and reactive to light.   Neck:      Thyroid: No thyromegaly.      Trachea: No tracheal deviation.   Cardiovascular:      Rate and Rhythm: Normal rate and regular rhythm.      Heart sounds: Normal heart sounds. No murmur heard.  Pulmonary:      Effort: Pulmonary effort is normal. No respiratory distress.      Breath sounds: Normal breath sounds.   Abdominal:      General: There is no distension.      Palpations: Abdomen is soft.   Musculoskeletal:         General: Normal range of motion.      Cervical back: Normal range of motion and neck supple.   Lymphadenopathy:      Cervical: No cervical adenopathy.   Skin:     General: Skin is warm and dry.      Findings: No rash.   Neurological:      Mental Status: He is alert and oriented to person, place, and time.      Coordination: Coordination normal.   Psychiatric:         Mood and Affect: Mood normal.         Behavior: Behavior normal.         Thought Content: Thought content normal.         Judgment: Judgment normal.           Assessment:         ICD-10-CM ICD-9-CM   1. Annual physical exam  Z00.00 V70.0   2. Mixed hyperlipidemia  E78.2 272.2   3. IFG (impaired fasting glucose)  R73.01 790.21   4. Class 1 obesity due to excess calories without serious comorbidity with body mass index (BMI) of 31.0 to 31.9 in adult  E66.09 278.00    Z68.31 V85.31   5. Erectile dysfunction, unspecified erectile dysfunction type  N52.9 607.84   6. History of colon polyps  Z86.010 V12.72       Plan:       1. Annual physical exam   Health Maintenance Summary   Full History      Expand All  Collapse All  Postponed - COVID-19 Vaccine   (1)Postponed until 1/19/2025  No completion history exists for this topic.   Hemoglobin A1c (Prediabetes)   (Yearly)Next due on 1/18/2025 01/18/2024  Hemoglobin A1C External component of Hemoglobin A1C   01/24/2023   Hemoglobin A1C External component of Hemoglobin A1C   Colorectal Cancer Screening   (Colonoscopy - Every 5 Years)Next due on 2/3/2027  02/03/2022  Colonoscopy   02/03/2022  Surgical Procedure: COLONOSCOPY   Scheduled - Lipid Panel   (Every 5 Years)Scheduled for 4/9/2024 01/18/2024  Cholesterol Total component of Lipid Panel   01/24/2023  Cholesterol Total component of Lipid Panel   01/06/2022  Cholesterol Total component of Lipid Panel   06/03/2019  Cholesterol Total component of Lipid panel   01/23/2018  Cholesterol Total component of Lipid panel   View More History   TETANUS VACCINE   (Every 10 Years)Next due on 6/14/2029 06/14/2019  Imm Admin: Tdap   Hepatitis C Screening  Completed  01/06/2022  Hepatitis C Ab component of Hepatitis C Antibody   HIV Screening  Completed  01/06/2022  HIV 1/2 Ag/Ab (4th Gen)   Influenza Vaccine   (Series Information)Addressed  01/19/2024  Declined   Pneumococcal Vaccines (Age 0-64)   (Series Information)Aged Out  No completion history exists for this topic.        2. Mixed hyperlipidemia  Overview:  on lifestyle modifications only.  The 10-year ASCVD risk score (Milagros DK, et al., 2019) is: 5.5%    Values used to calculate the score:      Age: 48 years      Sex: Male      Is Non- : No      Diabetic: No      Tobacco smoker: No      Systolic Blood Pressure: 138 mmHg      Is BP treated: No      HDL Cholesterol: 38 mg/dL      Total Cholesterol: 232 mg/dL   Start Rosuvastatin 10 mg daily  Recheck in 3 months      Orders:  -     rosuvastatin (CRESTOR) 10 MG tablet; Take 1 tablet (10 mg total) by mouth once daily.  Dispense: 90 tablet; Refill: 0  -     Comprehensive Metabolic Panel; Future; Expected date: 01/19/2024  -     Lipid Panel; Future; Expected date: 01/19/2024    3. IFG (impaired fasting glucose)  Overview:   with HgbA1C 5.6%  Advised on lifestyle modifications  Recheck in 3 months when checking cholesterol        4. Class 1 obesity due to excess  calories without serious comorbidity with body mass index (BMI) of 31.0 to 31.9 in adult  Overview:  Body mass index is 31.44 kg/m².  Working on lifestyle modficatons      5. Erectile dysfunction, unspecified erectile dysfunction type  Overview:  treated by Dr. Quintanilla in past.   Takes Cialis 10 mg prn.    Stable and effective      6. History of colon polyps  Overview:  Colonoscopy 2/3/22 - repeat in 5 years - Dr. Arndt         Follow up in about 3 months (around 4/18/2024) for fasting labs and follow up.     Patient's Medications   New Prescriptions    ROSUVASTATIN (CRESTOR) 10 MG TABLET    Take 1 tablet (10 mg total) by mouth once daily.   Previous Medications    IBUPROFEN (ADVIL,MOTRIN) 400 MG TABLET    Take 400 mg by mouth every 4 (four) hours.    TADALAFIL (CIALIS) 10 MG TABLET    Take 1 tablet (10 mg total) by mouth daily as needed for Erectile Dysfunction.   Modified Medications    No medications on file   Discontinued Medications    No medications on file       Past Medical History:   Diagnosis Date    ED (erectile dysfunction)     treated by Dr. Quintanilla    Hearing loss associated with syndrome, bilateral     bilateral hearing loss with chronic tinnitus -tested by audiologist    Hyperlipidemia 2017    on lifestyle modifications only       Past Surgical History:   Procedure Laterality Date    COLONOSCOPY N/A 2/3/2022    Procedure: COLONOSCOPY;  Surgeon: JULIO C Arndt MD;  Location: [facility];  Service: Endoscopy;  Laterality: N/A;  COLONOSCOPY Golytely Covid test 1/31/2022    HERNIA REPAIR      hernia repair as a baby - unsure of location of hernia    KNEE SURGERY Left     infected blood vessel while in the services    SURGICAL REMOVAL OF LYMPH NODE Left     left groin - removed due to infection    TONSILLECTOMY      TYMPANOSTOMY TUBE PLACEMENT      chronic ear infections as a child       Family History   Problem Relation Age of Onset    Emphysema Mother     Hypertension Mother      Hyperlipidemia Mother     Diabetes Father     Hypertension Father     Hyperlipidemia Father     No Known Problems Sister     No Known Problems Daughter     No Known Problems Sister        Social History     Socioeconomic History    Marital status:    Occupational History    Occupation: IT/computer work   Tobacco Use    Smoking status: Former     Current packs/day: 0.00     Average packs/day: 1 pack/day for 30.0 years (30.0 ttl pk-yrs)     Types: Cigarettes     Start date: 1986     Quit date: 2016     Years since quittin.7    Smokeless tobacco: Never   Substance and Sexual Activity    Alcohol use: Yes     Comment: once every 3 months - 2 to 3 drinks per occasion    Drug use: No

## 2024-04-03 ENCOUNTER — LAB VISIT (OUTPATIENT)
Dept: LAB | Facility: HOSPITAL | Age: 49
End: 2024-04-03
Attending: NURSE PRACTITIONER
Payer: COMMERCIAL

## 2024-04-03 DIAGNOSIS — E78.2 MIXED HYPERLIPIDEMIA: ICD-10-CM

## 2024-04-03 LAB
ALBUMIN SERPL BCP-MCNC: 4 G/DL (ref 3.5–5.2)
ALP SERPL-CCNC: 80 U/L (ref 38–126)
ALT SERPL W/O P-5'-P-CCNC: 26 U/L (ref 10–44)
ANION GAP SERPL CALC-SCNC: 9 MMOL/L (ref 8–16)
AST SERPL-CCNC: 21 U/L (ref 15–46)
BILIRUB SERPL-MCNC: 0.5 MG/DL (ref 0.1–1)
CALCIUM SERPL-MCNC: 9.5 MG/DL (ref 8.7–10.5)
CHLORIDE SERPL-SCNC: 109 MMOL/L (ref 95–110)
CHOLEST SERPL-MCNC: 134 MG/DL (ref 120–199)
CHOLEST/HDLC SERPL: 4.5 {RATIO} (ref 2–5)
CO2 SERPL-SCNC: 28 MMOL/L (ref 23–29)
CREAT SERPL-MCNC: 1.02 MG/DL (ref 0.5–1.4)
EST. GFR  (NO RACE VARIABLE): >60 ML/MIN/1.73 M^2
GLUCOSE SERPL-MCNC: 117 MG/DL (ref 70–110)
HDLC SERPL-MCNC: 30 MG/DL (ref 40–75)
HDLC SERPL: 22.4 % (ref 20–50)
LDLC SERPL CALC-MCNC: 80 MG/DL (ref 63–159)
NONHDLC SERPL-MCNC: 104 MG/DL
POTASSIUM SERPL-SCNC: 5.2 MMOL/L (ref 3.5–5.1)
PROT SERPL-MCNC: 6.7 G/DL (ref 6–8.4)
SODIUM SERPL-SCNC: 146 MMOL/L (ref 136–145)
TRIGL SERPL-MCNC: 120 MG/DL (ref 30–150)
UUN UR-MCNC: 19 MG/DL (ref 2–20)

## 2024-04-03 PROCEDURE — 80053 COMPREHEN METABOLIC PANEL: CPT | Mod: PN | Performed by: NURSE PRACTITIONER

## 2024-04-03 PROCEDURE — 80061 LIPID PANEL: CPT | Performed by: NURSE PRACTITIONER

## 2024-04-03 PROCEDURE — 36415 COLL VENOUS BLD VENIPUNCTURE: CPT | Mod: PN | Performed by: NURSE PRACTITIONER

## 2024-04-12 ENCOUNTER — OFFICE VISIT (OUTPATIENT)
Dept: FAMILY MEDICINE | Facility: CLINIC | Age: 49
End: 2024-04-12
Payer: COMMERCIAL

## 2024-04-12 VITALS
HEIGHT: 73 IN | BODY MASS INDEX: 32.37 KG/M2 | SYSTOLIC BLOOD PRESSURE: 128 MMHG | DIASTOLIC BLOOD PRESSURE: 84 MMHG | OXYGEN SATURATION: 96 % | TEMPERATURE: 98 F | HEART RATE: 100 BPM | WEIGHT: 244.25 LBS

## 2024-04-12 DIAGNOSIS — N52.9 ERECTILE DYSFUNCTION, UNSPECIFIED ERECTILE DYSFUNCTION TYPE: ICD-10-CM

## 2024-04-12 DIAGNOSIS — E78.2 MIXED HYPERLIPIDEMIA: ICD-10-CM

## 2024-04-12 DIAGNOSIS — H91.93 HEARING LOSS ASSOCIATED WITH SYNDROME, BILATERAL: ICD-10-CM

## 2024-04-12 DIAGNOSIS — R73.01 IFG (IMPAIRED FASTING GLUCOSE): Primary | ICD-10-CM

## 2024-04-12 PROCEDURE — 1159F MED LIST DOCD IN RCRD: CPT | Mod: CPTII,S$GLB,, | Performed by: NURSE PRACTITIONER

## 2024-04-12 PROCEDURE — 3074F SYST BP LT 130 MM HG: CPT | Mod: CPTII,S$GLB,, | Performed by: NURSE PRACTITIONER

## 2024-04-12 PROCEDURE — 3008F BODY MASS INDEX DOCD: CPT | Mod: CPTII,S$GLB,, | Performed by: NURSE PRACTITIONER

## 2024-04-12 PROCEDURE — 99999 PR PBB SHADOW E&M-EST. PATIENT-LVL IV: CPT | Mod: PBBFAC,,, | Performed by: NURSE PRACTITIONER

## 2024-04-12 PROCEDURE — 99214 OFFICE O/P EST MOD 30 MIN: CPT | Mod: S$GLB,,, | Performed by: NURSE PRACTITIONER

## 2024-04-12 PROCEDURE — 3044F HG A1C LEVEL LT 7.0%: CPT | Mod: CPTII,S$GLB,, | Performed by: NURSE PRACTITIONER

## 2024-04-12 PROCEDURE — 1160F RVW MEDS BY RX/DR IN RCRD: CPT | Mod: CPTII,S$GLB,, | Performed by: NURSE PRACTITIONER

## 2024-04-12 PROCEDURE — 3079F DIAST BP 80-89 MM HG: CPT | Mod: CPTII,S$GLB,, | Performed by: NURSE PRACTITIONER

## 2024-04-12 RX ORDER — ROSUVASTATIN CALCIUM 10 MG/1
10 TABLET, COATED ORAL DAILY
Qty: 90 TABLET | Refills: 1 | Status: SHIPPED | OUTPATIENT
Start: 2024-04-12 | End: 2025-04-12

## 2024-04-12 RX ORDER — TADALAFIL 10 MG/1
TABLET ORAL
Qty: 30 TABLET | Refills: 1 | Status: SHIPPED | OUTPATIENT
Start: 2024-04-12

## 2024-04-12 NOTE — PROGRESS NOTES
Subjective:       Patient ID: Nicolas Marx is a 49 y.o. male.    Chief Complaint: Follow-up (Pt here for 3 month f/u) and Medication Problem (Pt feels bloated, feeling heavy// cresior)    Follow-up        Patient is a 49-year-old white male with hyperlipidemia, bilateral hearing loss, and primary erectile dysfunction treated by urologist in the past that is here today for 3 month follow up with fasting lab results.     Hyperlipidemia  Start Rosuvastatin 10 mg daily in January 2023  Cholesterol much improved  Does report bloating and weight gain since starting medication  Advised on lifestyle modifications.  Recheck in 3 months          Erectile dysfunction   treated by Dr. Quintanilla in past.   Takes Cialis 10 mg prn.    Stable and effective  Refill sent to pharmacy     Chronic bilateral hearing loss over 50% to ears   was evaluated by audiologist in the past.     Impaired Fasting Glucose   with HgbA1C 5.6% in January 2024  FBG now 117  Advised on lifestyle modifications  Recheck in 3 months when checking cholesterol          Obesity  Body mass index is 31.44 kg/m².  Working on lifestyle modficatons        Personal History of Colon Polyps  Colonoscopy 2/3/22 - repeat in 5 years - Dr. Arndt     Lab Visit on 04/03/2024   Component Date Value Ref Range Status    Sodium 04/03/2024 146 (H)  136 - 145 mmol/L Final    Potassium 04/03/2024 5.2 (H)  3.5 - 5.1 mmol/L Final    Chloride 04/03/2024 109  95 - 110 mmol/L Final    CO2 04/03/2024 28  23 - 29 mmol/L Final    Glucose 04/03/2024 117 (H)  70 - 110 mg/dL Final    BUN 04/03/2024 19  2 - 20 mg/dL Final    Creatinine 04/03/2024 1.02  0.50 - 1.40 mg/dL Final    Calcium 04/03/2024 9.5  8.7 - 10.5 mg/dL Final    Total Protein 04/03/2024 6.7  6.0 - 8.4 g/dL Final    Albumin 04/03/2024 4.0  3.5 - 5.2 g/dL Final    Total Bilirubin 04/03/2024 0.5  0.1 - 1.0 mg/dL Final    Comment: For infants and newborns, interpretation of results should be based  on gestational age,  weight and in agreement with clinical  observations.    Premature Infant recommended reference ranges:  Up to 24 hours.............<8.0 mg/dL  Up to 48 hours............<12.0 mg/dL  3-5 days..................<15.0 mg/dL  6-29 days.................<15.0 mg/dL      Alkaline Phosphatase 04/03/2024 80  38 - 126 U/L Final    AST 04/03/2024 21  15 - 46 U/L Final    ALT 04/03/2024 26  10 - 44 U/L Final    Anion Gap 04/03/2024 9  8 - 16 mmol/L Final    eGFR 04/03/2024 >60.0  >60 mL/min/1.73 m^2 Final    Cholesterol 04/03/2024 134  120 - 199 mg/dL Final    Comment: The National Cholesterol Education Program (NCEP) has set the  following guidelines (reference ranges) for Cholesterol:  Optimal.....................<200 mg/dL  Borderline High.............200-239 mg/dL  High........................> or = 240 mg/dL      Triglycerides 04/03/2024 120  30 - 150 mg/dL Final    Comment: The National Cholesterol Education Program (NCEP) has set the  following guidelines (reference values) for triglycerides:  Normal......................<150 mg/dL  Borderline High.............150-199 mg/dL  High........................200-499 mg/dL      HDL 04/03/2024 30 (L)  40 - 75 mg/dL Final    Comment: The National Cholesterol Education Program (NCEP) has set the  following guidelines (reference values) for HDL Cholesterol:  Low...............<40 mg/dL  Optimal...........>60 mg/dL      LDL Cholesterol 04/03/2024 80.0  63.0 - 159.0 mg/dL Final    Comment: The National Cholesterol Education Program (NCEP) has set the  following guidelines (reference values) for LDL Cholesterol:  Optimal.......................<130 mg/dL  Borderline High...............130-159 mg/dL  High..........................160-189 mg/dL  Very High.....................>190 mg/dL      HDL/Cholesterol Ratio 04/03/2024 22.4  20.0 - 50.0 % Final    Total Cholesterol/HDL Ratio 04/03/2024 4.5  2.0 - 5.0 Final    Non-HDL Cholesterol 04/03/2024 104  mg/dL Final    Comment: Risk category  "and Non-HDL cholesterol goals:  Coronary heart disease (CHD)or equivalent (10-year risk of CHD >20%):  Non-HDL cholesterol goal     <130 mg/dL  Two or more CHD risk factors and 10-year risk of CHD <= 20%:  Non-HDL cholesterol goal     <160 mg/dL  0 to 1 CHD risk factor:  Non-HDL cholesterol goal     <190 mg/dL         Review of Systems   HENT: Negative.     Respiratory: Negative.     Cardiovascular: Negative.    Gastrointestinal: Negative.          Objective:     Vitals:    04/12/24 1508 04/12/24 1523   BP:  128/84   Pulse: 106 100   Temp: 98.2 °F (36.8 °C)    TempSrc: Temporal    SpO2: (!) 93% 96%   Weight: 110.8 kg (244 lb 4.3 oz)    Height: 6' 1" (1.854 m)           Physical Exam  Constitutional:       General: He is not in acute distress.     Appearance: Normal appearance. He is well-developed. He is obese. He is not toxic-appearing or diaphoretic.      Comments: Body mass index is 32.23 kg/m².   HENT:      Head: Normocephalic and atraumatic.      Right Ear: External ear normal. Tympanic membrane is scarred.      Left Ear: External ear normal. Tympanic membrane is scarred.   Eyes:      General: No scleral icterus.        Right eye: No discharge.         Left eye: No discharge.      Pupils: Pupils are equal, round, and reactive to light.   Neck:      Thyroid: No thyromegaly.      Trachea: No tracheal deviation.   Cardiovascular:      Rate and Rhythm: Normal rate and regular rhythm.      Heart sounds: Normal heart sounds. No murmur heard.  Pulmonary:      Effort: Pulmonary effort is normal. No respiratory distress.      Breath sounds: Normal breath sounds.   Abdominal:      General: There is no distension.      Palpations: Abdomen is soft.   Musculoskeletal:         General: Normal range of motion.      Cervical back: Normal range of motion and neck supple.   Lymphadenopathy:      Cervical: No cervical adenopathy.   Skin:     General: Skin is warm and dry.      Findings: No rash.   Neurological:      Mental " Status: He is alert and oriented to person, place, and time.      Coordination: Coordination normal.   Psychiatric:         Mood and Affect: Mood normal.         Behavior: Behavior normal.         Thought Content: Thought content normal.         Judgment: Judgment normal.           Assessment:         ICD-10-CM ICD-9-CM   1. IFG (impaired fasting glucose)  R73.01 790.21   2. Mixed hyperlipidemia  E78.2 272.2   3. Erectile dysfunction, unspecified erectile dysfunction type  N52.9 607.84   4. Hearing loss associated with syndrome, bilateral  H91.93 389.8       Plan:       1. IFG (impaired fasting glucose)  Overview:   with HgbA1C 5.6% in January 2024  FBG now 117  Advised on lifestyle modifications  Recheck in 3 months when checking cholesterol      Orders:  -     Comprehensive Metabolic Panel; Future; Expected date: 04/12/2024  -     Hemoglobin A1C; Future; Expected date: 04/12/2024    2. Mixed hyperlipidemia  Overview:  Start Rosuvastatin 10 mg daily in January 2023  Cholesterol much improved  Does report bloating and weight gain since starting medication  Advised on lifestyle modifications.  Recheck in 3 months      Orders:  -     Lipid Panel; Future; Expected date: 04/12/2024  -     rosuvastatin (CRESTOR) 10 MG tablet; Take 1 tablet (10 mg total) by mouth once daily.  Dispense: 90 tablet; Refill: 1    3. Erectile dysfunction, unspecified erectile dysfunction type  Overview:  reated by Dr. Quintanilla in past.   Takes Cialis 10 mg prn.    Stable and effective  Refill sent to pharmacy    Orders:  -     tadalafiL (CIALIS) 10 MG tablet; Take 1 tablet as needed for erectile dysfunction.  Max dose is 1 tablet per 72 hours.  Dispense: 30 tablet; Refill: 1    4. Hearing loss associated with syndrome, bilateral  Overview:  bilateral hearing loss with chronic tinnitus -tested by audiologist         Follow up in about 5 months (around 9/12/2024) for fasting labs and follow up.     Patient's Medications   New  Prescriptions    No medications on file   Previous Medications    IBUPROFEN (ADVIL,MOTRIN) 400 MG TABLET    Take 400 mg by mouth every 4 (four) hours.   Modified Medications    Modified Medication Previous Medication    ROSUVASTATIN (CRESTOR) 10 MG TABLET rosuvastatin (CRESTOR) 10 MG tablet       Take 1 tablet (10 mg total) by mouth once daily.    Take 1 tablet (10 mg total) by mouth once daily.    TADALAFIL (CIALIS) 10 MG TABLET tadalafiL (CIALIS) 10 MG tablet       Take 1 tablet as needed for erectile dysfunction.  Max dose is 1 tablet per 72 hours.    Take 1 tablet (10 mg total) by mouth daily as needed for Erectile Dysfunction.   Discontinued Medications    No medications on file       Past Medical History:   Diagnosis Date    ED (erectile dysfunction)     treated by Dr. Quintanilla    Hearing loss associated with syndrome, bilateral     bilateral hearing loss with chronic tinnitus -tested by audiologist    Hyperlipidemia 2017    on lifestyle modifications only       Past Surgical History:   Procedure Laterality Date    COLONOSCOPY N/A 2/3/2022    Procedure: COLONOSCOPY;  Surgeon: JULIO C Arndt MD;  Location: [facility];  Service: Endoscopy;  Laterality: N/A;  COLONOSCOPY Golytely Covid test 1/31/2022    HERNIA REPAIR      hernia repair as a baby - unsure of location of hernia    KNEE SURGERY Left     infected blood vessel while in the services    SURGICAL REMOVAL OF LYMPH NODE Left     left groin - removed due to infection    TONSILLECTOMY      TYMPANOSTOMY TUBE PLACEMENT      chronic ear infections as a child       Family History   Problem Relation Age of Onset    Emphysema Mother     Hypertension Mother     Hyperlipidemia Mother     Diabetes Father     Hypertension Father     Hyperlipidemia Father     No Known Problems Sister     No Known Problems Daughter     No Known Problems Sister        Social History     Socioeconomic History    Marital status:    Occupational History    Occupation:  IT/computer work   Tobacco Use    Smoking status: Former     Current packs/day: 0.00     Average packs/day: 1 pack/day for 30.0 years (30.0 ttl pk-yrs)     Types: Cigarettes     Start date: 1986     Quit date: 2016     Years since quittin.0    Smokeless tobacco: Never   Substance and Sexual Activity    Alcohol use: Yes     Comment: once every 3 months - 2 to 3 drinks per occasion    Drug use: No

## 2024-09-23 ENCOUNTER — OFFICE VISIT (OUTPATIENT)
Dept: FAMILY MEDICINE | Facility: CLINIC | Age: 49
End: 2024-09-23
Payer: COMMERCIAL

## 2024-09-23 VITALS
SYSTOLIC BLOOD PRESSURE: 126 MMHG | OXYGEN SATURATION: 96 % | HEART RATE: 96 BPM | TEMPERATURE: 98 F | WEIGHT: 244.06 LBS | DIASTOLIC BLOOD PRESSURE: 78 MMHG | BODY MASS INDEX: 32.34 KG/M2 | HEIGHT: 73 IN

## 2024-09-23 DIAGNOSIS — H91.93 HEARING LOSS ASSOCIATED WITH SYNDROME, BILATERAL: ICD-10-CM

## 2024-09-23 DIAGNOSIS — Z86.010 HISTORY OF COLON POLYPS: ICD-10-CM

## 2024-09-23 DIAGNOSIS — Z13.1 DIABETES MELLITUS SCREENING: ICD-10-CM

## 2024-09-23 DIAGNOSIS — Z00.00 ENCOUNTER FOR BLOOD TEST FOR ROUTINE GENERAL PHYSICAL EXAMINATION: ICD-10-CM

## 2024-09-23 DIAGNOSIS — Z13.29 THYROID DISORDER SCREEN: ICD-10-CM

## 2024-09-23 DIAGNOSIS — N52.9 ERECTILE DYSFUNCTION, UNSPECIFIED ERECTILE DYSFUNCTION TYPE: ICD-10-CM

## 2024-09-23 DIAGNOSIS — Z12.5 SCREENING PSA (PROSTATE SPECIFIC ANTIGEN): ICD-10-CM

## 2024-09-23 DIAGNOSIS — E66.09 CLASS 1 OBESITY DUE TO EXCESS CALORIES WITHOUT SERIOUS COMORBIDITY WITH BODY MASS INDEX (BMI) OF 32.0 TO 32.9 IN ADULT: ICD-10-CM

## 2024-09-23 DIAGNOSIS — R73.01 IFG (IMPAIRED FASTING GLUCOSE): ICD-10-CM

## 2024-09-23 DIAGNOSIS — R73.03 PREDIABETES: ICD-10-CM

## 2024-09-23 DIAGNOSIS — M72.2 PLANTAR FASCIITIS OF LEFT FOOT: ICD-10-CM

## 2024-09-23 DIAGNOSIS — Z13.0 SCREENING FOR DEFICIENCY ANEMIA: ICD-10-CM

## 2024-09-23 DIAGNOSIS — E78.2 MIXED HYPERLIPIDEMIA: Primary | ICD-10-CM

## 2024-09-23 PROCEDURE — 1160F RVW MEDS BY RX/DR IN RCRD: CPT | Mod: CPTII,S$GLB,, | Performed by: NURSE PRACTITIONER

## 2024-09-23 PROCEDURE — 3074F SYST BP LT 130 MM HG: CPT | Mod: CPTII,S$GLB,, | Performed by: NURSE PRACTITIONER

## 2024-09-23 PROCEDURE — 1159F MED LIST DOCD IN RCRD: CPT | Mod: CPTII,S$GLB,, | Performed by: NURSE PRACTITIONER

## 2024-09-23 PROCEDURE — 3044F HG A1C LEVEL LT 7.0%: CPT | Mod: CPTII,S$GLB,, | Performed by: NURSE PRACTITIONER

## 2024-09-23 PROCEDURE — 99999 PR PBB SHADOW E&M-EST. PATIENT-LVL III: CPT | Mod: PBBFAC,,, | Performed by: NURSE PRACTITIONER

## 2024-09-23 PROCEDURE — 3078F DIAST BP <80 MM HG: CPT | Mod: CPTII,S$GLB,, | Performed by: NURSE PRACTITIONER

## 2024-09-23 PROCEDURE — 3008F BODY MASS INDEX DOCD: CPT | Mod: CPTII,S$GLB,, | Performed by: NURSE PRACTITIONER

## 2024-09-23 PROCEDURE — 99214 OFFICE O/P EST MOD 30 MIN: CPT | Mod: S$GLB,,, | Performed by: NURSE PRACTITIONER

## 2024-09-23 RX ORDER — DICLOFENAC SODIUM 75 MG/1
75 TABLET, DELAYED RELEASE ORAL 2 TIMES DAILY
Qty: 60 TABLET | Refills: 0 | Status: SHIPPED | OUTPATIENT
Start: 2024-09-23

## 2024-09-23 NOTE — PROGRESS NOTES
Subjective:       Patient ID: Nicolas Marx is a 49 y.o. male.    Chief Complaint: Follow-up (5 months F/U) and Heel Pain        HPI WITH ASSESSMENT AND PLAN OF CARE:        Patient is a 49-year-old white male with hyperlipidemia, bilateral hearing loss, and primary erectile dysfunction treated by urologist in the past that is here today for 5 month follow up with fasting lab results. Patient also has complaint of left heel pain for past couple weeks.     Hyperlipidemia  Start Rosuvastatin 10 mg daily in January 2023  Cholesterol much improved  Recheck in 6 months.            Erectile dysfunction   treated by Dr. Quintanilla in past.   Takes Cialis 10 mg prn.    Stable and effective       Chronic bilateral hearing loss over 50% to ears   was evaluated by audiologist in the past.       Impaired Fasting Glucose/Prediabetes   with HgbA1C 5.6% in January 2024  FBG now 112 with A1C 5.7%  Advised on lifestyle modifications  Recheck in 6 months for wellness exam            Obesity  Body mass index is 32.2 kg/m².  Working on lifestyle modficatons           Personal History of Colon Polyps  Colonoscopy 2/3/22 - repeat in 5 years - Dr. Arndt        Left Heel Pain  Started 2 to 3 weeks ago  No trauma  Pain when first getting out of bed and after prolonged sitting  Has not taken anything for the pain  Has not done stretches due to pain  + Plantar Fasciitis left foot  Will treat with Diclofenac BID and foot exercises given  If not improving after 2 weeks - patient is to message me for podiatry referral.      Lab Visit on 09/18/2024   Component Date Value Ref Range Status    Sodium 09/18/2024 141  136 - 145 mmol/L Final    Potassium 09/18/2024 4.2  3.5 - 5.1 mmol/L Final    Chloride 09/18/2024 111 (H)  95 - 110 mmol/L Final    CO2 09/18/2024 22 (L)  23 - 29 mmol/L Final    Glucose 09/18/2024 112 (H)  70 - 110 mg/dL Final    BUN 09/18/2024 16  6 - 20 mg/dL Final    Creatinine 09/18/2024 1.0  0.5 - 1.4 mg/dL Final     Calcium 09/18/2024 9.1  8.7 - 10.5 mg/dL Final    Total Protein 09/18/2024 6.5  6.0 - 8.4 g/dL Final    Albumin 09/18/2024 3.7  3.5 - 5.2 g/dL Final    Total Bilirubin 09/18/2024 0.5  0.1 - 1.0 mg/dL Final    Comment: For infants and newborns, interpretation of results should be based  on gestational age, weight and in agreement with clinical  observations.    Premature Infant recommended reference ranges:  Up to 24 hours.............<8.0 mg/dL  Up to 48 hours............<12.0 mg/dL  3-5 days..................<15.0 mg/dL  6-29 days.................<15.0 mg/dL      Alkaline Phosphatase 09/18/2024 68  55 - 135 U/L Final    AST 09/18/2024 16  10 - 40 U/L Final    ALT 09/18/2024 26  10 - 44 U/L Final    eGFR 09/18/2024 >60.0  >60 mL/min/1.73 m^2 Final    Anion Gap 09/18/2024 8  8 - 16 mmol/L Final    Hemoglobin A1C 09/18/2024 5.7 (H)  4.0 - 5.6 % Final    Comment: ADA Screening Guidelines:  5.7-6.4%  Consistent with prediabetes  >or=6.5%  Consistent with diabetes    High levels of fetal hemoglobin interfere with the HbA1C  assay. Heterozygous hemoglobin variants (HbS, HgC, etc)do  not significantly interfere with this assay.   However, presence of multiple variants may affect accuracy.      Estimated Avg Glucose 09/18/2024 117  68 - 131 mg/dL Final    Cholesterol 09/18/2024 139  120 - 199 mg/dL Final    Comment: The National Cholesterol Education Program (NCEP) has set the  following guidelines (reference ranges) for Cholesterol:  Optimal.....................<200 mg/dL  Borderline High.............200-239 mg/dL  High........................> or = 240 mg/dL      Triglycerides 09/18/2024 106  30 - 150 mg/dL Final    Comment: The National Cholesterol Education Program (NCEP) has set the  following guidelines (reference values) for triglycerides:  Normal......................<150 mg/dL  Borderline High.............150-199 mg/dL  High........................200-499 mg/dL      HDL 09/18/2024 42  40 - 75 mg/dL Final     "Comment: The National Cholesterol Education Program (NCEP) has set the  following guidelines (reference values) for HDL Cholesterol:  Low...............<40 mg/dL  Optimal...........>60 mg/dL      LDL Cholesterol 09/18/2024 75.8  63.0 - 159.0 mg/dL Final    Comment: The National Cholesterol Education Program (NCEP) has set the  following guidelines (reference values) for LDL Cholesterol:  Optimal.......................<130 mg/dL  Borderline High...............130-159 mg/dL  High..........................160-189 mg/dL  Very High.....................>190 mg/dL      HDL/Cholesterol Ratio 09/18/2024 30.2  20.0 - 50.0 % Final    Total Cholesterol/HDL Ratio 09/18/2024 3.3  2.0 - 5.0 Final    Non-HDL Cholesterol 09/18/2024 97  mg/dL Final    Comment: Risk category and Non-HDL cholesterol goals:  Coronary heart disease (CHD)or equivalent (10-year risk of CHD >20%):  Non-HDL cholesterol goal     <130 mg/dL  Two or more CHD risk factors and 10-year risk of CHD <= 20%:  Non-HDL cholesterol goal     <160 mg/dL  0 to 1 CHD risk factor:  Non-HDL cholesterol goal     <190 mg/dL         Vitals:    09/23/24 0947   BP: 126/78   BP Location: Left arm   Patient Position: Sitting   BP Method: Large (Manual)   Pulse: 96   Temp: 97.7 °F (36.5 °C)   TempSrc: Temporal   SpO2: 96%   Weight: 110.7 kg (244 lb 0.8 oz)   Height: 6' 1" (1.854 m)         Diagnoses this Encounter:         ICD-10-CM ICD-9-CM   1. Mixed hyperlipidemia  E78.2 272.2   2. IFG (impaired fasting glucose)  R73.01 790.21   3. Prediabetes  R73.03 790.29   4. Erectile dysfunction, unspecified erectile dysfunction type  N52.9 607.84   5. Class 1 obesity due to excess calories without serious comorbidity with body mass index (BMI) of 32.0 to 32.9 in adult  E66.09 278.00    Z68.32 V85.32   6. Hearing loss associated with syndrome, bilateral  H91.93 389.8   7. History of colon polyps  Z86.010 V12.72   8. Plantar fasciitis of left foot  M72.2 728.71       Orders Placed This " Encounter    CBC Auto Differential    Comprehensive Metabolic Panel    Hemoglobin A1C    Lipid Panel    TSH    PSA, Screening    diclofenac (VOLTAREN) 75 MG EC tablet        Follow up in about 6 months (around 3/23/2025) for fasting labs and WELLNESS EXAM.     Patient's Medications   New Prescriptions    DICLOFENAC (VOLTAREN) 75 MG EC TABLET    Take 1 tablet (75 mg total) by mouth 2 (two) times daily.   Previous Medications    ROSUVASTATIN (CRESTOR) 10 MG TABLET    Take 1 tablet (10 mg total) by mouth once daily.    TADALAFIL (CIALIS) 10 MG TABLET    Take 1 tablet as needed for erectile dysfunction.  Max dose is 1 tablet per 72 hours.   Modified Medications    No medications on file   Discontinued Medications    IBUPROFEN (ADVIL,MOTRIN) 400 MG TABLET    Take 400 mg by mouth every 4 (four) hours.         Review of Systems   HENT: Negative.     Respiratory: Negative.     Cardiovascular: Negative.    Gastrointestinal: Negative.    Musculoskeletal:  Positive for arthralgias.        Left heel pain         Objective:        Physical Exam  Constitutional:       General: He is not in acute distress.     Appearance: Normal appearance. He is well-developed. He is obese. He is not toxic-appearing or diaphoretic.      Comments: Body mass index is 32.2 kg/m².     HENT:      Head: Normocephalic and atraumatic.      Right Ear: External ear normal. Tympanic membrane is scarred.      Left Ear: External ear normal. Tympanic membrane is scarred.   Eyes:      General: No scleral icterus.        Right eye: No discharge.         Left eye: No discharge.      Pupils: Pupils are equal, round, and reactive to light.   Neck:      Thyroid: No thyromegaly.      Trachea: No tracheal deviation.   Cardiovascular:      Rate and Rhythm: Normal rate and regular rhythm.      Pulses:           Dorsalis pedis pulses are 2+ on the left side.      Heart sounds: Normal heart sounds. No murmur heard.  Pulmonary:      Effort: Pulmonary effort is normal. No  respiratory distress.      Breath sounds: Normal breath sounds.   Abdominal:      General: There is no distension.      Palpations: Abdomen is soft.   Musculoskeletal:         General: Normal range of motion.      Cervical back: Normal range of motion and neck supple.        Feet:    Feet:      Left foot:      Protective Sensation: 7 sites tested.  7 sites sensed.      Skin integrity: No ulcer or skin breakdown.   Lymphadenopathy:      Cervical: No cervical adenopathy.   Skin:     General: Skin is warm and dry.      Findings: No rash.   Neurological:      Mental Status: He is alert and oriented to person, place, and time.      Coordination: Coordination normal.   Psychiatric:         Mood and Affect: Mood normal.         Behavior: Behavior normal.         Thought Content: Thought content normal.         Judgment: Judgment normal.             Past Medical History:   Diagnosis Date    ED (erectile dysfunction)     treated by Dr. Quintanilla    Hearing loss associated with syndrome, bilateral     bilateral hearing loss with chronic tinnitus -tested by audiologist    Hyperlipidemia 2017    on lifestyle modifications only       Past Surgical History:   Procedure Laterality Date    COLONOSCOPY N/A 2/3/2022    Procedure: COLONOSCOPY;  Surgeon: JULIO C Arndt MD;  Location: [facility];  Service: Endoscopy;  Laterality: N/A;  COLONOSCOPY Golytely Covid test 1/31/2022    HERNIA REPAIR      hernia repair as a baby - unsure of location of hernia    KNEE SURGERY Left     infected blood vessel while in the services    SURGICAL REMOVAL OF LYMPH NODE Left     left groin - removed due to infection    TONSILLECTOMY      TYMPANOSTOMY TUBE PLACEMENT      chronic ear infections as a child       Family History   Problem Relation Name Age of Onset    Emphysema Mother      Hypertension Mother      Hyperlipidemia Mother      Diabetes Father      Hypertension Father      Hyperlipidemia Father      No Known Problems Sister      No Known  Problems Daughter      No Known Problems Sister         Social History     Socioeconomic History    Marital status:    Occupational History    Occupation: IT/computer work   Tobacco Use    Smoking status: Former     Current packs/day: 0.00     Average packs/day: 1 pack/day for 30.0 years (30.0 ttl pk-yrs)     Types: Cigarettes     Start date: 1986     Quit date: 2016     Years since quittin.4    Smokeless tobacco: Never   Substance and Sexual Activity    Alcohol use: Yes     Comment: once every 3 months - 2 to 3 drinks per occasion    Drug use: No

## 2024-10-23 DIAGNOSIS — E78.2 MIXED HYPERLIPIDEMIA: ICD-10-CM

## 2024-10-23 RX ORDER — ROSUVASTATIN CALCIUM 10 MG/1
10 TABLET, COATED ORAL
Qty: 90 TABLET | Refills: 1 | Status: SHIPPED | OUTPATIENT
Start: 2024-10-23

## 2024-11-21 ENCOUNTER — OFFICE VISIT (OUTPATIENT)
Dept: UROLOGY | Facility: CLINIC | Age: 49
End: 2024-11-21
Payer: COMMERCIAL

## 2024-11-21 VITALS
BODY MASS INDEX: 32.52 KG/M2 | HEIGHT: 73 IN | DIASTOLIC BLOOD PRESSURE: 89 MMHG | SYSTOLIC BLOOD PRESSURE: 143 MMHG | HEART RATE: 78 BPM | WEIGHT: 245.38 LBS

## 2024-11-21 DIAGNOSIS — S39.94XA INJURY TO PENIS, INITIAL ENCOUNTER: Primary | ICD-10-CM

## 2024-11-21 DIAGNOSIS — N48.89 PENILE PAIN: ICD-10-CM

## 2024-11-21 PROCEDURE — 3008F BODY MASS INDEX DOCD: CPT | Mod: CPTII,S$GLB,, | Performed by: NURSE PRACTITIONER

## 2024-11-21 PROCEDURE — 3044F HG A1C LEVEL LT 7.0%: CPT | Mod: CPTII,S$GLB,, | Performed by: NURSE PRACTITIONER

## 2024-11-21 PROCEDURE — 3079F DIAST BP 80-89 MM HG: CPT | Mod: CPTII,S$GLB,, | Performed by: NURSE PRACTITIONER

## 2024-11-21 PROCEDURE — 1159F MED LIST DOCD IN RCRD: CPT | Mod: CPTII,S$GLB,, | Performed by: NURSE PRACTITIONER

## 2024-11-21 PROCEDURE — 99999 PR PBB SHADOW E&M-EST. PATIENT-LVL IV: CPT | Mod: PBBFAC,,, | Performed by: NURSE PRACTITIONER

## 2024-11-21 PROCEDURE — 99214 OFFICE O/P EST MOD 30 MIN: CPT | Mod: S$GLB,,, | Performed by: NURSE PRACTITIONER

## 2024-11-21 PROCEDURE — 1160F RVW MEDS BY RX/DR IN RCRD: CPT | Mod: CPTII,S$GLB,, | Performed by: NURSE PRACTITIONER

## 2024-11-21 PROCEDURE — 3077F SYST BP >= 140 MM HG: CPT | Mod: CPTII,S$GLB,, | Performed by: NURSE PRACTITIONER

## 2024-11-21 NOTE — PROGRESS NOTES
Subjective:       Patient ID: Nicolas Marx is a 49 y.o. male.    Chief Complaint: Lump on Penis    Patient is new to me. He is a 50 yo WM who is here today for evaluation of penile injury during sexual intercourse this week. Patient states something didn't feel right and he noticed an abnormal upward curvature of his penis with mild to moderate pain. Patient has a hx of ED and hyperlipidemia. He is currently taking tadaladil 10 mg prn daily for his ED.     Penis Injury  The patient's primary symptoms include a genital injury and penile pain. The patient's pertinent negatives include no genital itching, genital lesions, priapism, scrotal swelling or testicular pain. This is a new problem. The current episode started in the past 7 days. The problem occurs daily. The problem has been unchanged. The pain is mild. Pertinent negatives include no abdominal pain, chills, discolored urine, dysuria, fever, flank pain, frequency, hematuria, hesitancy, urgency or vomiting. There is A penile injury. The symptoms are aggravated by tactile pressure and intercourse. He has tried nothing for the symptoms. He is sexually active. His past medical history is significant for an erectile aid use, erectile dysfunction and prostatitis. There is no history of BPH, a femoral hernia, an inguinal hernia, kidney stones or varicocele.     Review of Systems   Constitutional:  Negative for chills and fever.   Gastrointestinal:  Negative for abdominal pain, change in bowel habit and vomiting.   Genitourinary:  Positive for penile pain. Negative for difficulty urinating, dysuria, flank pain, frequency, hematuria, hesitancy, penile swelling, scrotal swelling, testicular pain and urgency.         Objective:      Physical Exam  Vitals and nursing note reviewed.   Constitutional:       General: He is not in acute distress.     Appearance: He is well-developed. He is not ill-appearing.   HENT:      Head: Normocephalic and atraumatic.   Eyes:       Pupils: Pupils are equal, round, and reactive to light.   Cardiovascular:      Rate and Rhythm: Normal rate.   Pulmonary:      Effort: Pulmonary effort is normal. No respiratory distress.   Abdominal:      Palpations: Abdomen is soft.      Tenderness: There is no abdominal tenderness.   Musculoskeletal:         General: Normal range of motion.      Cervical back: Normal range of motion.   Skin:     General: Skin is warm and dry.   Neurological:      Mental Status: He is alert and oriented to person, place, and time.      Coordination: Coordination normal.   Psychiatric:         Mood and Affect: Mood normal.         Behavior: Behavior normal.         Thought Content: Thought content normal.         Judgment: Judgment normal.         Assessment:       Problem List Items Addressed This Visit    None  Visit Diagnoses       Injury to penis, initial encounter    -  Primary    Relevant Orders    US Soft Tissue Penis    Penile pain        Relevant Orders    US Soft Tissue Penis            Plan:           Nicolas was seen today for lump on penis.    Diagnoses and all orders for this visit:    Injury to penis, initial encounter  -     US Soft Tissue Penis; Future    Penile pain  -     US Soft Tissue Penis; Future    Follow-up pending U/S results.     Alin Cha, DNP

## 2024-11-22 ENCOUNTER — TELEPHONE (OUTPATIENT)
Dept: UROLOGY | Facility: CLINIC | Age: 49
End: 2024-11-22
Payer: COMMERCIAL

## 2024-11-22 DIAGNOSIS — N48.6 PEYRONIE'S SYNDROME: Primary | ICD-10-CM

## 2024-11-22 RX ORDER — COLCHICINE 0.6 MG/1
0.6 TABLET ORAL DAILY
Qty: 30 TABLET | Refills: 11 | Status: SHIPPED | OUTPATIENT
Start: 2024-11-22 | End: 2025-11-22

## 2024-11-22 RX ORDER — TADALAFIL 5 MG/1
5 TABLET ORAL DAILY
Qty: 30 TABLET | Refills: 11 | Status: SHIPPED | OUTPATIENT
Start: 2024-11-22 | End: 2025-11-22

## 2024-11-22 NOTE — PROGRESS NOTES
Diagnoses and all orders for this visit:    Peyronie's syndrome  -     colchicine (COLCRYS) 0.6 mg tablet; Take 1 tablet (0.6 mg total) by mouth once daily.  -     tadalafiL (CIALIS) 5 MG tablet; Take 1 tablet (5 mg total) by mouth Daily.    Other order  Start taking Vitamin E 1500 international units daily.    Follow-up in 6 weeks.     Alin Cha, DNP

## 2024-11-22 NOTE — TELEPHONE ENCOUNTER
----- Message from Alin Cha DNP sent at 11/22/2024 12:53 PM CST -----  Please inform patient via telephone that his U/S of penis was normal. I would like to treat patient for Peyronie's Syndrome. Script for colchicine 0.6 mg daily and tadalafil 5 mg daily were sent to [facility]. Patient also needs to get over-the-counter Vitamin E and take 1500 international units daily. Patient should take all three meds as directed and follow-up in 6 weeks.

## 2024-11-22 NOTE — TELEPHONE ENCOUNTER
----- Message from Alin hCa DNP sent at 11/22/2024 12:53 PM CST -----  Please inform patient via telephone that his U/S of penis was normal. I would like to treat patient for Peyronie's Syndrome. Script for colchicine 0.6 mg daily and tadalafil 5 mg daily were sent to [facility]. Patient also needs to get over-the-counter Vitamin E and take 1500 international units daily. Patient should take all three meds as directed and follow-up in 6 weeks.

## 2025-05-30 ENCOUNTER — TELEPHONE (OUTPATIENT)
Dept: PHARMACY | Facility: CLINIC | Age: 50
End: 2025-05-30
Payer: COMMERCIAL

## 2025-05-31 NOTE — TELEPHONE ENCOUNTER
[facility] Chart Review & Patient Outreach Details For Medication Adherence Project    Reason for Outreach Encounter: 3rd Party payor non-compliance report ([employer], [employer], C, etc)  2.  Patient Outreach Method: [employer] message  3.   Medication in question: rosuvastatin    LAST FILLED: 11/9/24 for 90 day supply  Hyperlipidemia Medications              rosuvastatin (CRESTOR) 10 MG tablet TAKE 1 TABLET BY MOUTH EVERY DAY               4.  Reviewed and or Updates Made To: Patient Chart  5. Outreach Outcomes and/or actions taken: Sent inquiry to patient: Waiting for response.

## 2025-07-07 ENCOUNTER — TELEPHONE (OUTPATIENT)
Dept: PHARMACY | Facility: CLINIC | Age: 50
End: 2025-07-07
Payer: COMMERCIAL

## 2025-07-07 NOTE — TELEPHONE ENCOUNTER
[facility] Chart Review & Patient Outreach Details For Medication Adherence Project    Reason for Outreach Encounter: 3rd Party payor non-compliance report ([employer], [employer], [employer], etc)  2.  Patient Outreach Method: Reviewed patient chart   3.   Medication in question:    Hyperlipidemia Medications              rosuvastatin (CRESTOR) 10 MG tablet TAKE 1 TABLET BY MOUTH EVERY DAY                  rosuvastatin   last filled  11/09/24 for 90 day supply    4.  Reviewed and or Updates Made To: Patient Chart  5. Outreach Outcomes and/or actions taken: Patient reminded to  prescription  Additional Notes:

## 2025-08-07 ENCOUNTER — TELEPHONE (OUTPATIENT)
Dept: PHARMACY | Facility: CLINIC | Age: 50
End: 2025-08-07
Payer: COMMERCIAL

## 2025-08-07 NOTE — TELEPHONE ENCOUNTER
[facility] Chart Review & Patient Outreach Details For Medication Adherence Project    Reason for Outreach Encounter: 3rd Party payor non-compliance report ([employer], [employer], C, etc)  2.  Patient Outreach Method: [employer] message  3.   Medication in question: rosuvastatin    LAST FILLED: 11/9/24 for 90 day supply  Hyperlipidemia Medications              rosuvastatin (CRESTOR) 10 MG tablet TAKE 1 TABLET BY MOUTH EVERY DAY               4.  Reviewed and or Updates Made To: Patient Chart  5. Outreach Outcomes and/or actions taken: Sent inquiry to patient: Waiting for response.